# Patient Record
Sex: MALE | Race: WHITE | NOT HISPANIC OR LATINO | URBAN - METROPOLITAN AREA
[De-identification: names, ages, dates, MRNs, and addresses within clinical notes are randomized per-mention and may not be internally consistent; named-entity substitution may affect disease eponyms.]

---

## 2019-04-17 ENCOUNTER — OUTPATIENT (OUTPATIENT)
Dept: OUTPATIENT SERVICES | Facility: HOSPITAL | Age: 64
LOS: 1 days | Discharge: HOME | End: 2019-04-17

## 2019-04-17 ENCOUNTER — OUTPATIENT (OUTPATIENT)
Dept: OUTPATIENT SERVICES | Facility: HOSPITAL | Age: 64
LOS: 1 days | Discharge: HOME | End: 2019-04-17
Payer: COMMERCIAL

## 2019-04-17 DIAGNOSIS — Z79.01 LONG TERM (CURRENT) USE OF ANTICOAGULANTS: ICD-10-CM

## 2019-04-17 DIAGNOSIS — Z01.810 ENCOUNTER FOR PREPROCEDURAL CARDIOVASCULAR EXAMINATION: ICD-10-CM

## 2019-04-17 DIAGNOSIS — R06.02 SHORTNESS OF BREATH: ICD-10-CM

## 2019-04-17 DIAGNOSIS — I25.10 ATHEROSCLEROTIC HEART DISEASE OF NATIVE CORONARY ARTERY WITHOUT ANGINA PECTORIS: ICD-10-CM

## 2019-04-17 DIAGNOSIS — R07.89 OTHER CHEST PAIN: ICD-10-CM

## 2019-04-17 PROCEDURE — 78452 HT MUSCLE IMAGE SPECT MULT: CPT | Mod: 26

## 2019-04-19 ENCOUNTER — INPATIENT (INPATIENT)
Facility: HOSPITAL | Age: 64
LOS: 6 days | Discharge: HOME HEALTH | End: 2019-04-26
Attending: THORACIC SURGERY (CARDIOTHORACIC VASCULAR SURGERY) | Admitting: THORACIC SURGERY (CARDIOTHORACIC VASCULAR SURGERY)
Payer: COMMERCIAL

## 2019-04-19 VITALS
TEMPERATURE: 98 F | SYSTOLIC BLOOD PRESSURE: 150 MMHG | WEIGHT: 182.1 LBS | DIASTOLIC BLOOD PRESSURE: 72 MMHG | RESPIRATION RATE: 16 BRPM | OXYGEN SATURATION: 100 % | HEIGHT: 71 IN | HEART RATE: 81 BPM

## 2019-04-19 DIAGNOSIS — Z96.641 PRESENCE OF RIGHT ARTIFICIAL HIP JOINT: Chronic | ICD-10-CM

## 2019-04-19 PROCEDURE — 71045 X-RAY EXAM CHEST 1 VIEW: CPT | Mod: 26

## 2019-04-19 PROCEDURE — 71250 CT THORAX DX C-: CPT | Mod: 26

## 2019-04-19 RX ORDER — SODIUM CHLORIDE 9 MG/ML
1000 INJECTION INTRAMUSCULAR; INTRAVENOUS; SUBCUTANEOUS
Qty: 0 | Refills: 0 | Status: DISCONTINUED | OUTPATIENT
Start: 2019-04-19 | End: 2019-04-22

## 2019-04-19 RX ORDER — ENOXAPARIN SODIUM 100 MG/ML
40 INJECTION SUBCUTANEOUS EVERY 12 HOURS
Qty: 0 | Refills: 0 | Status: DISCONTINUED | OUTPATIENT
Start: 2019-04-19 | End: 2019-04-19

## 2019-04-19 RX ORDER — SODIUM CHLORIDE 9 MG/ML
3 INJECTION INTRAMUSCULAR; INTRAVENOUS; SUBCUTANEOUS EVERY 8 HOURS
Qty: 0 | Refills: 0 | Status: DISCONTINUED | OUTPATIENT
Start: 2019-04-19 | End: 2019-04-22

## 2019-04-19 RX ORDER — METOPROLOL TARTRATE 50 MG
12.5 TABLET ORAL EVERY 12 HOURS
Qty: 0 | Refills: 0 | Status: DISCONTINUED | OUTPATIENT
Start: 2019-04-19 | End: 2019-04-19

## 2019-04-19 RX ORDER — ATORVASTATIN CALCIUM 80 MG/1
20 TABLET, FILM COATED ORAL AT BEDTIME
Qty: 0 | Refills: 0 | Status: DISCONTINUED | OUTPATIENT
Start: 2019-04-19 | End: 2019-04-22

## 2019-04-19 RX ORDER — DOCUSATE SODIUM 100 MG
100 CAPSULE ORAL THREE TIMES A DAY
Qty: 0 | Refills: 0 | Status: DISCONTINUED | OUTPATIENT
Start: 2019-04-19 | End: 2019-04-22

## 2019-04-19 RX ORDER — ENOXAPARIN SODIUM 100 MG/ML
40 INJECTION SUBCUTANEOUS EVERY 24 HOURS
Qty: 0 | Refills: 0 | Status: DISCONTINUED | OUTPATIENT
Start: 2019-04-19 | End: 2019-04-21

## 2019-04-19 RX ORDER — PANTOPRAZOLE SODIUM 20 MG/1
40 TABLET, DELAYED RELEASE ORAL
Qty: 0 | Refills: 0 | Status: DISCONTINUED | OUTPATIENT
Start: 2019-04-19 | End: 2019-04-22

## 2019-04-19 RX ORDER — ACETAMINOPHEN 500 MG
650 TABLET ORAL ONCE
Qty: 0 | Refills: 0 | Status: COMPLETED | OUTPATIENT
Start: 2019-04-19 | End: 2019-04-21

## 2019-04-19 RX ORDER — METOPROLOL TARTRATE 50 MG
12.5 TABLET ORAL
Qty: 0 | Refills: 0 | Status: DISCONTINUED | OUTPATIENT
Start: 2019-04-19 | End: 2019-04-22

## 2019-04-19 RX ORDER — LANOLIN ALCOHOL/MO/W.PET/CERES
5 CREAM (GRAM) TOPICAL AT BEDTIME
Qty: 0 | Refills: 0 | Status: DISCONTINUED | OUTPATIENT
Start: 2019-04-19 | End: 2019-04-22

## 2019-04-19 RX ORDER — ASPIRIN/CALCIUM CARB/MAGNESIUM 324 MG
81 TABLET ORAL DAILY
Qty: 0 | Refills: 0 | Status: DISCONTINUED | OUTPATIENT
Start: 2019-04-19 | End: 2019-04-22

## 2019-04-19 RX ADMIN — Medication 5 MILLIGRAM(S): at 22:21

## 2019-04-19 RX ADMIN — SODIUM CHLORIDE 3 MILLILITER(S): 9 INJECTION INTRAMUSCULAR; INTRAVENOUS; SUBCUTANEOUS at 14:41

## 2019-04-19 RX ADMIN — Medication 12.5 MILLIGRAM(S): at 19:09

## 2019-04-19 RX ADMIN — ATORVASTATIN CALCIUM 20 MILLIGRAM(S): 80 TABLET, FILM COATED ORAL at 22:18

## 2019-04-19 RX ADMIN — SODIUM CHLORIDE 3 MILLILITER(S): 9 INJECTION INTRAMUSCULAR; INTRAVENOUS; SUBCUTANEOUS at 22:21

## 2019-04-19 RX ADMIN — SODIUM CHLORIDE 75 MILLILITER(S): 9 INJECTION INTRAMUSCULAR; INTRAVENOUS; SUBCUTANEOUS at 14:40

## 2019-04-19 RX ADMIN — Medication 100 MILLIGRAM(S): at 22:18

## 2019-04-19 NOTE — PRE-ANESTHESIA EVALUATION ADULT - NSANTHPEFT_GEN_ALL_CORE
CV: normal S1/S2  Chest: CTA  Extremities: palpable left radial pulse, unable to assess right radial pulse due to dressing

## 2019-04-19 NOTE — PROGRESS NOTE ADULT - SUBJECTIVE AND OBJECTIVE BOX
Surgeon: /Chyna/ Autumn    Consult requesting by: Ralph    HISTORY OF PRESENT ILLNESS:  64 yo M h/o HTN has been c/o chest pain had + [place] elective cardiac cath, which revealed severe CAD with Left main involvement. Patient admitted post-procedure for myocardial revascularization via CABG      NYHA functional class    [ ] Class I (no limitation) [ X ] Class II (slight limitation) [ ] Class III (marked limitation) [ ] Class IV (symptoms at rest)    PAST MEDICAL & SURGICAL HISTORY:  Malignant neoplasm of urinary bladder, unspecified site  History of right hip replacement  HTN    Home Medications: ASA 81 mg and irbesartan    MEDICATIONS  (STANDING):  aspirin enteric coated 81 milliGRAM(s) Oral daily  atorvastatin 20 milliGRAM(s) Oral at bedtime  docusate sodium 100 milliGRAM(s) Oral three times a day  enoxaparin Injectable 40 milliGRAM(s) SubCutaneous every 12 hours  melatonin 5 milliGRAM(s) Oral at bedtime  metoprolol tartrate 12.5 milliGRAM(s) Oral two times a day  pantoprazole    Tablet 40 milliGRAM(s) Oral before breakfast  sodium chloride 0.9% lock flush 3 milliLiter(s) IV Push every 8 hours  sodium chloride 0.9%. 1000 milliLiter(s) (75 mL/Hr) IV Continuous <Continuous>    MEDICATIONS  (PRN):    Antiplatelet therapy:     ASA today                      Last dose/amt:    Allergies    No Known Allergies    Intolerances        SOCIAL HISTORY:  denies alcohol abuse, smoking and illicit drug usage  Occupation:  Lives with: wife  Assisted device use: none  5 meter walk test: 4.8 / 5.1 / 5.2  FAMILY HISTORY: no early heart disease      Review of Systems  CONSTITUTIONAL: no fever, chills or night sweats]   NEURO:  denies seizures, paralysis or paresthesias                                                                                EYES: wears glasses, no double vision, no blurry vision                                                                          ENMT: no difficulty hearing, vertigo, dysphagia, epistaxis recent dental work [ ]                                      CV:  denies chest pain, BRIGGS or palpatations at current time                                                                                            RESPIRATORY:  no cough or hemoptysis                                                                 GI:  no nausea, vomiting, constipation or diarrhea   : denies dysuria, hematuria, incontinence or retention                                                                                           MUSKULOSKELETAL:  denies joint swelling or muscle weakness  PSYCH:  denies dementia, depresion, anxiety   ENDOCRINE:  cold intolerance[ ] heat intolerance[ ] polydipsia[ ]                                                                                                                                                                                                PHYSICAL EXAM  CONSTITUTIONAL:    well nourished, well developed, mildly overweight in NAD                                                                       Neuro: oriented to person/place & time with no focal motor or sensory  deficits     Eyes: PERRLA, EOMI, no nystagmus, sclera anicteric  ENT:  nasal/oral mucosa with absence of cyanosis, fair dentition  Neck: no jugular vein distention, trachea midline, no goiter   Chest: bilateral breath sounds with good air movement absence of wheezes, rales, or rhonchi                                                                           CV:  RSR, S1S2, no significant murmur appreciated  Carotids: No Bruits  GI:  soft, non-tender non-distended, + bowel sounds                                                                                                          Extremities:  no evidence of cyanosis or deformity, no pedal edema   Extremity Pulses: right / left:  femorals 2+/ 2+; DP's 2+/2+; radials 2+/2+    ? negative Allens  SKIN : no rashes                                                          LABS:  Pending    Cardiac Cath: double vessel disease    Recommendation: (Procedures/Evaluations)  CT Chest without contrast  Carotid Duplex   PFT : Simple PFT   TTE    STS Score:   Isolated CAB  Risk of Mortality:  0.763%  Renal Failure:  0.885%  Permanent Stroke:  0.614%  Prolonged Ventilation:  2.539%  DSW Infection:  0.105%  Reoperation:  1.671%  Morbidity or Mortality:  4.572%  Short Length of Stay:  68.737%  Long Length of Stay:  1.584%  Impression:    CAD [ ]      Assessment/ Plan:  Will initiate pre-op work-up  Attending to see Surgeon: /Chyna/ Autumn    Consult requesting by: Shaye    HISTORY OF PRESENT ILLNESS:  64 yo M h/o HTN has been c/o chest pain had + [place] for elective cardiac cath, which revealed severe CAD with Left main involvement. Patient admitted post-procedure for myocardial revascularization via CABG      NYHA functional class    [ ] Class I (no limitation) [ X ] Class II (slight limitation) [ ] Class III (marked limitation) [ ] Class IV (symptoms at rest)    PAST MEDICAL & SURGICAL HISTORY:  Malignant neoplasm of urinary bladder, unspecified site  History of right hip replacement  HTN    Home Medications: ASA 81 mg and irbesartan    MEDICATIONS  (STANDING):  aspirin enteric coated 81 milliGRAM(s) Oral daily  atorvastatin 20 milliGRAM(s) Oral at bedtime  docusate sodium 100 milliGRAM(s) Oral three times a day  enoxaparin Injectable 40 milliGRAM(s) SubCutaneous every 12 hours  melatonin 5 milliGRAM(s) Oral at bedtime  metoprolol tartrate 12.5 milliGRAM(s) Oral two times a day  pantoprazole    Tablet 40 milliGRAM(s) Oral before breakfast  sodium chloride 0.9% lock flush 3 milliLiter(s) IV Push every 8 hours  sodium chloride 0.9%. 1000 milliLiter(s) (75 mL/Hr) IV Continuous <Continuous>    MEDICATIONS  (PRN):    Antiplatelet therapy:     ASA today                      Last dose/amt:    Allergies    No Known Allergies    SOCIAL HISTORY:  denies alcohol abuse, smoking and illicit drug usage  Occupation:  Lives with: wife  Assisted device use: none  5 meter walk test: 4.8 / 5.1 / 5.2  FAMILY HISTORY: no early heart disease    Review of Systems  CONSTITUTIONAL: no fever, chills or night sweats]   NEURO:  denies seizures, paralysis or paresthesias                                                                                EYES: wears glasses, no double vision, no blurry vision                                                                          ENMT: no difficulty hearing, vertigo, dysphagia, epistaxis recent dental work [ ]                                      CV:  denies chest pain, BRIGGS or palpatations at current time                                                                                            RESPIRATORY:  no cough or hemoptysis                                                                 GI:  no nausea, vomiting, constipation or diarrhea   : denies dysuria, hematuria, incontinence or retention                                                                                           MUSKULOSKELETAL:  denies joint swelling or muscle weakness  PSYCH:  denies dementia, depresion, anxiety   ENDOCRINE:  cold intolerance[ ] heat intolerance[ ] polydipsia[ ]                                                                                                                                                                                                PHYSICAL EXAM  CONSTITUTIONAL:    well nourished, well developed, mildly overweight in NAD                                                                       Neuro: oriented to person/place & time with no focal motor or sensory  deficits     Eyes: PERRLA, EOMI, no nystagmus, sclera anicteric  ENT:  nasal/oral mucosa with absence of cyanosis, fair dentition  Neck: no jugular vein distention, trachea midline, no goiter   Chest: bilateral breath sounds with good air movement absence of wheezes, rales, or rhonchi                                                                           CV:  RSR, S1S2, no significant murmur appreciated  Carotids: No Bruits  GI:  soft, non-tender non-distended, + bowel sounds                                                                                                          Extremities:  no evidence of cyanosis or deformity, no pedal edema   Extremity Pulses: right / left:  femorals 2+/ 2+; DP's 2+/2+; radials 2+/2+    ? negative Allens  SKIN : no rashes                                                          LABS:  Pending    Cardiac Cath: double vessel disease    Recommendation: (Procedures/Evaluations)  CT Chest without contrast  Carotid Duplex   PFT : Simple PFT   TTE    STS Score:   Isolated CAB  Risk of Mortality:  0.763%  Renal Failure:  0.885%  Permanent Stroke:  0.614%  Prolonged Ventilation:  2.539%  DSW Infection:  0.105%  Reoperation:  1.671%  Morbidity or Mortality:  4.572%  Short Length of Stay:  68.737%  Long Length of Stay:  1.584%  Impression:    CAD [ ]      Assessment/ Plan:  Will initiate pre-op work-up  Attending to see

## 2019-04-19 NOTE — CHART NOTE - NSCHARTNOTEFT_GEN_A_CORE
PRE-OP DIAGNOSIS: suspected CAD/abnormla stress test    PROCEDURE: [facility] with coronary angiography    Physician: RYAN German MD  Assistant: TRINI Dhillon MD    ANESTHESIA TYPE:  [  ]General Anesthesia  [x  ] Sedation  [ x ] Local/Regional    ESTIMATED BLOOD LOSS:    10   mL    CONDITION  [  ] Critical  [  ] Serious  [  ]Fair  [ x ]Good    IV CONTRAST:     55        mL    FINDINGS    Left Heart Catheterization:  LVEF%: 65  LVEDP: Normal  Right dominant  Right radial 6 Fr  Radial D stat    2 V CAD      LEFT HEART CATHETERIZATION                                    Left main distal LM 70%, IVUS 4.6 mm2    LAD:   mid LAD 80%                     Diag: Small diffuse ds    Left Circumflex: Small, moderate ds    Right Coronary Artery: Med to large vessel, mild ectasia,   RPDA normal  RPL Normal    Ramus Intermed:      POST-OP DIAGNOSIS  2 V CAD      PLAN OF CARE    [x ] Return to In-patient bed    [ x] CT Surgery consult called

## 2019-04-19 NOTE — H&P CARDIOLOGY - HISTORY OF PRESENT ILLNESS
62 yo M h/o HTN has been c/o chest pain had + stress test [place]pt [place] for elective cardiac cath

## 2019-04-19 NOTE — PRE-ANESTHESIA EVALUATION ADULT - NSANTHPMHFT_GEN_ALL_CORE
HTN, HLD, CAD (hx of CP with positive stress test, elective cath on 4/19 found disease of the LM, LAD, and RCA distributions), ET ~ 1 mile, bladder ca s/p partial resection, GERD

## 2019-04-20 LAB
ALBUMIN SERPL ELPH-MCNC: 3.8 G/DL — SIGNIFICANT CHANGE UP (ref 3.5–5.2)
ALP SERPL-CCNC: 63 U/L — SIGNIFICANT CHANGE UP (ref 30–115)
ALT FLD-CCNC: 12 U/L — SIGNIFICANT CHANGE UP (ref 0–41)
ANION GAP SERPL CALC-SCNC: 12 MMOL/L — SIGNIFICANT CHANGE UP (ref 7–14)
APPEARANCE UR: CLEAR — SIGNIFICANT CHANGE UP
APTT BLD: 32.4 SEC — SIGNIFICANT CHANGE UP (ref 27–39.2)
AST SERPL-CCNC: 13 U/L — SIGNIFICANT CHANGE UP (ref 0–41)
BACTERIA # UR AUTO: ABNORMAL /HPF
BASOPHILS # BLD AUTO: 0.03 K/UL — SIGNIFICANT CHANGE UP (ref 0–0.2)
BASOPHILS NFR BLD AUTO: 0.5 % — SIGNIFICANT CHANGE UP (ref 0–1)
BILIRUB SERPL-MCNC: 0.5 MG/DL — SIGNIFICANT CHANGE UP (ref 0.2–1.2)
BILIRUB UR-MCNC: NEGATIVE — SIGNIFICANT CHANGE UP
BLD GP AB SCN SERPL QL: SIGNIFICANT CHANGE UP
BUN SERPL-MCNC: 24 MG/DL — HIGH (ref 10–20)
CALCIUM SERPL-MCNC: 9 MG/DL — SIGNIFICANT CHANGE UP (ref 8.5–10.1)
CHLORIDE SERPL-SCNC: 107 MMOL/L — SIGNIFICANT CHANGE UP (ref 98–110)
CO2 SERPL-SCNC: 24 MMOL/L — SIGNIFICANT CHANGE UP (ref 17–32)
COLOR SPEC: YELLOW — SIGNIFICANT CHANGE UP
CREAT SERPL-MCNC: 1.3 MG/DL — SIGNIFICANT CHANGE UP (ref 0.7–1.5)
DIFF PNL FLD: ABNORMAL
EOSINOPHIL # BLD AUTO: 0.18 K/UL — SIGNIFICANT CHANGE UP (ref 0–0.7)
EOSINOPHIL NFR BLD AUTO: 3.3 % — SIGNIFICANT CHANGE UP (ref 0–8)
ESTIMATED AVERAGE GLUCOSE: 120 MG/DL — HIGH (ref 68–114)
GLUCOSE SERPL-MCNC: 91 MG/DL — SIGNIFICANT CHANGE UP (ref 70–99)
GLUCOSE UR QL: NEGATIVE MG/DL — SIGNIFICANT CHANGE UP
HBA1C BLD-MCNC: 5.8 % — HIGH (ref 4–5.6)
HCT VFR BLD CALC: 39.7 % — LOW (ref 42–52)
HGB BLD-MCNC: 12.9 G/DL — LOW (ref 14–18)
IMM GRANULOCYTES NFR BLD AUTO: 0.4 % — HIGH (ref 0.1–0.3)
INR BLD: 1.02 RATIO — SIGNIFICANT CHANGE UP (ref 0.65–1.3)
KETONES UR-MCNC: NEGATIVE — SIGNIFICANT CHANGE UP
LEUKOCYTE ESTERASE UR-ACNC: NEGATIVE — SIGNIFICANT CHANGE UP
LYMPHOCYTES # BLD AUTO: 1.24 K/UL — SIGNIFICANT CHANGE UP (ref 1.2–3.4)
LYMPHOCYTES # BLD AUTO: 22.5 % — SIGNIFICANT CHANGE UP (ref 20.5–51.1)
MCHC RBC-ENTMCNC: 28.1 PG — SIGNIFICANT CHANGE UP (ref 27–31)
MCHC RBC-ENTMCNC: 32.5 G/DL — SIGNIFICANT CHANGE UP (ref 32–37)
MCV RBC AUTO: 86.5 FL — SIGNIFICANT CHANGE UP (ref 80–94)
MONOCYTES # BLD AUTO: 0.47 K/UL — SIGNIFICANT CHANGE UP (ref 0.1–0.6)
MONOCYTES NFR BLD AUTO: 8.5 % — SIGNIFICANT CHANGE UP (ref 1.7–9.3)
MRSA PCR RESULT.: NEGATIVE — SIGNIFICANT CHANGE UP
NEUTROPHILS # BLD AUTO: 3.57 K/UL — SIGNIFICANT CHANGE UP (ref 1.4–6.5)
NEUTROPHILS NFR BLD AUTO: 64.8 % — SIGNIFICANT CHANGE UP (ref 42.2–75.2)
NITRITE UR-MCNC: NEGATIVE — SIGNIFICANT CHANGE UP
NRBC # BLD: 0 /100 WBCS — SIGNIFICANT CHANGE UP (ref 0–0)
NT-PROBNP SERPL-SCNC: 63 PG/ML — SIGNIFICANT CHANGE UP (ref 0–300)
PH UR: 6 — SIGNIFICANT CHANGE UP (ref 5–8)
PLATELET # BLD AUTO: 261 K/UL — SIGNIFICANT CHANGE UP (ref 130–400)
POTASSIUM SERPL-MCNC: 4.4 MMOL/L — SIGNIFICANT CHANGE UP (ref 3.5–5)
POTASSIUM SERPL-SCNC: 4.4 MMOL/L — SIGNIFICANT CHANGE UP (ref 3.5–5)
PROT SERPL-MCNC: 6.1 G/DL — SIGNIFICANT CHANGE UP (ref 6–8)
PROT UR-MCNC: NEGATIVE MG/DL — SIGNIFICANT CHANGE UP
PROTHROM AB SERPL-ACNC: 11.7 SEC — SIGNIFICANT CHANGE UP (ref 9.95–12.87)
RBC # BLD: 4.59 M/UL — LOW (ref 4.7–6.1)
RBC # FLD: 12.6 % — SIGNIFICANT CHANGE UP (ref 11.5–14.5)
RBC CASTS # UR COMP ASSIST: ABNORMAL /HPF
SODIUM SERPL-SCNC: 143 MMOL/L — SIGNIFICANT CHANGE UP (ref 135–146)
SP GR SPEC: 1.02 — SIGNIFICANT CHANGE UP (ref 1.01–1.03)
T4 FREE SERPL-MCNC: 1.1 NG/DL — SIGNIFICANT CHANGE UP (ref 0.9–1.8)
TSH SERPL-MCNC: 1.1 UIU/ML — SIGNIFICANT CHANGE UP (ref 0.27–4.2)
TYPE + AB SCN PNL BLD: SIGNIFICANT CHANGE UP
UROBILINOGEN FLD QL: 0.2 MG/DL — SIGNIFICANT CHANGE UP (ref 0.2–0.2)
WBC # BLD: 5.51 K/UL — SIGNIFICANT CHANGE UP (ref 4.8–10.8)
WBC # FLD AUTO: 5.51 K/UL — SIGNIFICANT CHANGE UP (ref 4.8–10.8)
WBC UR QL: SIGNIFICANT CHANGE UP /HPF

## 2019-04-20 PROCEDURE — 99232 SBSQ HOSP IP/OBS MODERATE 35: CPT

## 2019-04-20 PROCEDURE — 93880 EXTRACRANIAL BILAT STUDY: CPT | Mod: 26

## 2019-04-20 RX ADMIN — Medication 100 MILLIGRAM(S): at 21:22

## 2019-04-20 RX ADMIN — PANTOPRAZOLE SODIUM 40 MILLIGRAM(S): 20 TABLET, DELAYED RELEASE ORAL at 06:55

## 2019-04-20 RX ADMIN — SODIUM CHLORIDE 3 MILLILITER(S): 9 INJECTION INTRAMUSCULAR; INTRAVENOUS; SUBCUTANEOUS at 21:23

## 2019-04-20 RX ADMIN — Medication 81 MILLIGRAM(S): at 11:21

## 2019-04-20 RX ADMIN — Medication 100 MILLIGRAM(S): at 06:55

## 2019-04-20 RX ADMIN — Medication 5 MILLIGRAM(S): at 21:23

## 2019-04-20 RX ADMIN — Medication 12.5 MILLIGRAM(S): at 06:55

## 2019-04-20 RX ADMIN — ATORVASTATIN CALCIUM 20 MILLIGRAM(S): 80 TABLET, FILM COATED ORAL at 21:22

## 2019-04-20 RX ADMIN — SODIUM CHLORIDE 3 MILLILITER(S): 9 INJECTION INTRAMUSCULAR; INTRAVENOUS; SUBCUTANEOUS at 14:06

## 2019-04-20 RX ADMIN — SODIUM CHLORIDE 3 MILLILITER(S): 9 INJECTION INTRAMUSCULAR; INTRAVENOUS; SUBCUTANEOUS at 06:55

## 2019-04-20 RX ADMIN — Medication 12.5 MILLIGRAM(S): at 17:25

## 2019-04-20 RX ADMIN — Medication 100 MILLIGRAM(S): at 14:06

## 2019-04-20 NOTE — PROGRESS NOTE ADULT - SUBJECTIVE AND OBJECTIVE BOX
SUBJ:  no cp or sob    MEDICATIONS  (STANDING):  aspirin enteric coated 81 milliGRAM(s) Oral daily  atorvastatin 20 milliGRAM(s) Oral at bedtime  docusate sodium 100 milliGRAM(s) Oral three times a day  enoxaparin Injectable 40 milliGRAM(s) SubCutaneous every 24 hours  melatonin 5 milliGRAM(s) Oral at bedtime  metoprolol tartrate 12.5 milliGRAM(s) Oral two times a day  pantoprazole    Tablet 40 milliGRAM(s) Oral before breakfast  sodium chloride 0.9% lock flush 3 milliLiter(s) IV Push every 8 hours  sodium chloride 0.9%. 1000 milliLiter(s) (75 mL/Hr) IV Continuous <Continuous>    MEDICATIONS  (PRN):  acetaminophen   Tablet .. 650 milliGRAM(s) Oral once PRN Moderate Pain (4 - 6)            Vital Signs Last 24 Hrs  T(C): 36.2 (20 Apr 2019 05:31), Max: 36.6 (19 Apr 2019 14:01)  T(F): 97.1 (20 Apr 2019 05:31), Max: 97.9 (19 Apr 2019 14:01)  HR: 76 (20 Apr 2019 05:31) (72 - 81)  BP: 126/66 (20 Apr 2019 05:31) (126/66 - 150/72)  BP(mean): --  RR: 17 (20 Apr 2019 05:31) (16 - 17)  SpO2: 100% (19 Apr 2019 15:21) (100% - 100%)          PHYSICAL EXAM:  · CONSTITUTIONAL:	Well-developed, well nourished    BMI-  ·RESPIRATORY:   airway patent; breath sounds equal; good air movement; respirations non-labored; clear to auscultation bilaterally; no chest wall tenderness; no intercostal retractions; no rales,rhonchi or wheeze  · CARDIOVASCULAR	regular rate and rhythm  no rub  no murmur  normal PMI  · EXTREMITIES: No cyanosis, clubbing or edema  · VASCULAR: 	Equal and normal pulses (carotid, femoral, dorsalis pedis)  	  TELEMETRY:sinus    ECG:    TTE:    LABS:                        12.9   5.51  )-----------( 261      ( 20 Apr 2019 07:31 )             39.7     04-20    143  |  107  |  24<H>  ----------------------------<  91  4.4   |  24  |  1.3    Ca    9.0      20 Apr 2019 07:31    TPro  6.1  /  Alb  3.8  /  TBili  0.5  /  DBili  x   /  AST  13  /  ALT  12  /  AlkPhos  63  04-20        PT/INR - ( 20 Apr 2019 07:31 )   PT: 11.70 sec;   INR: 1.02 ratio         PTT - ( 20 Apr 2019 07:31 )  PTT:32.4 sec    I&O's Summary    19 Apr 2019 07:01  -  20 Apr 2019 07:00  --------------------------------------------------------  IN: 375 mL / OUT: 0 mL / NET: 375 mL      BNPSerum Pro-Brain Natriuretic Peptide: 63 pg/mL (04-20 @ 07:31)    RADIOLOGY & ADDITIONAL STUDIES:    IMPRESSION AND PLAN:  s/p lHC   LM and LAD   NL EF on echo  stress test 2 days ago > 4mets     Rec  await CABG  please allow off tele for shower today

## 2019-04-21 RX ORDER — CHLORHEXIDINE GLUCONATE 213 G/1000ML
1 SOLUTION TOPICAL ONCE
Qty: 0 | Refills: 0 | Status: COMPLETED | OUTPATIENT
Start: 2019-04-22 | End: 2019-04-22

## 2019-04-21 RX ORDER — CHLORHEXIDINE GLUCONATE 213 G/1000ML
1 SOLUTION TOPICAL ONCE
Qty: 0 | Refills: 0 | Status: COMPLETED | OUTPATIENT
Start: 2019-04-21 | End: 2019-04-21

## 2019-04-21 RX ORDER — CHLORHEXIDINE GLUCONATE 213 G/1000ML
10 SOLUTION TOPICAL ONCE
Qty: 0 | Refills: 0 | Status: COMPLETED | OUTPATIENT
Start: 2019-04-21 | End: 2019-04-22

## 2019-04-21 RX ORDER — ALBUMIN HUMAN 25 %
3000 VIAL (ML) INTRAVENOUS ONCE
Qty: 0 | Refills: 0 | Status: DISCONTINUED | OUTPATIENT
Start: 2019-04-22 | End: 2019-04-22

## 2019-04-21 RX ADMIN — SODIUM CHLORIDE 3 MILLILITER(S): 9 INJECTION INTRAMUSCULAR; INTRAVENOUS; SUBCUTANEOUS at 04:14

## 2019-04-21 RX ADMIN — CHLORHEXIDINE GLUCONATE 1 APPLICATION(S): 213 SOLUTION TOPICAL at 21:43

## 2019-04-21 RX ADMIN — SODIUM CHLORIDE 3 MILLILITER(S): 9 INJECTION INTRAMUSCULAR; INTRAVENOUS; SUBCUTANEOUS at 21:43

## 2019-04-21 RX ADMIN — Medication 5 MILLIGRAM(S): at 21:43

## 2019-04-21 RX ADMIN — Medication 12.5 MILLIGRAM(S): at 17:07

## 2019-04-21 RX ADMIN — Medication 650 MILLIGRAM(S): at 11:00

## 2019-04-21 RX ADMIN — SODIUM CHLORIDE 3 MILLILITER(S): 9 INJECTION INTRAMUSCULAR; INTRAVENOUS; SUBCUTANEOUS at 13:30

## 2019-04-21 RX ADMIN — CHLORHEXIDINE GLUCONATE 1 APPLICATION(S): 213 SOLUTION TOPICAL at 17:50

## 2019-04-21 RX ADMIN — ATORVASTATIN CALCIUM 20 MILLIGRAM(S): 80 TABLET, FILM COATED ORAL at 21:42

## 2019-04-21 RX ADMIN — PANTOPRAZOLE SODIUM 40 MILLIGRAM(S): 20 TABLET, DELAYED RELEASE ORAL at 07:30

## 2019-04-21 RX ADMIN — Medication 81 MILLIGRAM(S): at 11:28

## 2019-04-21 RX ADMIN — Medication 650 MILLIGRAM(S): at 10:39

## 2019-04-21 RX ADMIN — Medication 100 MILLIGRAM(S): at 05:08

## 2019-04-21 RX ADMIN — ENOXAPARIN SODIUM 40 MILLIGRAM(S): 100 INJECTION SUBCUTANEOUS at 11:28

## 2019-04-21 RX ADMIN — Medication 100 MILLIGRAM(S): at 21:42

## 2019-04-21 RX ADMIN — Medication 12.5 MILLIGRAM(S): at 05:08

## 2019-04-21 NOTE — PROGRESS NOTE ADULT - SUBJECTIVE AND OBJECTIVE BOX
Cardiac Surgery Pre-op Note:  CC: Patient is a 63y old  Male who presents with a chief complaint of coronary artery disease involving left main (2019 13:52)    Referring Physician:   Dr. Cuba                                                                               Surgeon: Dr. Clemente     Procedure: (Date) (Procedure)  2019 CABG    Allergies    No Known Allergies      HPI:  64 yo M h/o HTN has been c/o chest pain had + stress test [place]pt [place] for elective cardiac cath (2019 09:38)  Cardiac cath reveals significant left main and LAD disease.      PAST MEDICAL & SURGICAL HISTORY:  Malignant neoplasm of urinary bladder, unspecified site  History of right hip replacement      MEDICATIONS  (STANDING):  aspirin enteric coated 81 milliGRAM(s) Oral daily  atorvastatin 20 milliGRAM(s) Oral at bedtime  docusate sodium 100 milliGRAM(s) Oral three times a day  enoxaparin Injectable 40 milliGRAM(s) SubCutaneous every 24 hours  melatonin 5 milliGRAM(s) Oral at bedtime  metoprolol tartrate 12.5 milliGRAM(s) Oral two times a day  pantoprazole    Tablet 40 milliGRAM(s) Oral before breakfast  sodium chloride 0.9% lock flush 3 milliLiter(s) IV Push every 8 hours  sodium chloride 0.9%. 1000 milliLiter(s) (75 mL/Hr) IV Continuous <Continuous>    Labs:                        12.9   5.51  )-----------( 261      ( 2019 07:31 )             39.7         143  |  107  |  24<H>  ----------------------------<  91  4.4   |  24  |  1.3    Ca    9.0      2019 07:31    TPro  6.1  /  Alb  3.8  /  TBili  0.5  /  DBili  x   /  AST  13  /  ALT  12  /  AlkPhos  63      PT/INR - ( 2019 07:31 )   PT: 11.70 sec;   INR: 1.02 ratio         PTT - ( 2019 07:31 )  PTT:32.4 sec    HGB A1C: Hemoglobin A1C, Whole Blood: 5.8 % ( @ 07:31)    Pro-BNP: Serum Pro-Brain Natriuretic Peptide: 63 pg/mL ( @ 07:31)    Thyroid Panel:  @ 07:31/1.10  1.1/--/--    MRSA: MRSA PCR Result.: Negative ( @ 12:51)   / MSSA:   Urinalysis Basic - ( 2019 20:05 )    Color: Yellow / Appearance: Clear / S.020 / pH: x  Gluc: x / Ketone: Negative  / Bili: Negative / Urobili: 0.2 mg/dL   Blood: x / Protein: Negative mg/dL / Nitrite: Negative   Leuk Esterase: Negative / RBC: 11-25 /HPF / WBC 1-2 /HPF   Sq Epi: x / Non Sq Epi: x / Bacteria: Few /HPF    CXR: < from: Xray Chest 1 View AP/PA (19 @ 14:07) >  Impression:      No radiographic evidence of acute cardiopulmonary disease.    < end of copied text >      Carotid Duplex:   < from: VA Duplex Carotid, Bilat (19 @ 14:47) >  Impression:    20-39% stenosis of the right internal carotid artery.    20-39% stenosis of the left internal carotid artery.    < end of copied text >      PFT's: FEV1: 96%    Echocardiogram: Transthoracic Echocardiogram:    EXAM:  2-D ECHO (TTE) COMPLETE        < from: VA Duplex Carotid, Bilat (19 @ 14:47) >  Impression:    20-39% stenosis of the right internal carotid artery.    20-39% stenosis of the left internal carotid artery.    < end of copied text >  PROCEDURE DATE:  2019      INTERPRETATION:  REPORT:    TRANSTHORACIC ECHOCARDIOGRAM REPORT         Patient Name:   SHAUNA BURKS Accession #: [id number]  Medical Rec #:  [id number]       Height:      70.9 in 180.0 cm  YOB: 1955      Weight:      182.1 lb 82.60 kg  Patient Age:    63 years       BSA:         2.02 m²  Patient Gender: M              BP:          150/72 mmHg       Date of Exam:        2019 2:19:51 PM  Referring Physician: [id number] SUDHIR CUBA  Sonographer:         Mary Carmen Hoffman  Fellow:              Jad Evangelista     Reading Physician:   Sudhir Cuba M.D.    Procedure:   2D Echo/Doppler/Color Doppler Complete.  Indications: R07.9 - Chest Pain, unspecified  Diagnosis:   R07.9 - Chest Pain, unspecified         Summary:   1. Thickening of the anterior mitral valve leaflet.    PHYSICIAN INTERPRETATION:  Left Ventricle: Normal left ventricular size and wall thicknesses, with   normal systolic and diastolic function.  Right Ventricle: Normal right ventricular size and function.  Left Atrium: Normal left atrial size.  Right Atrium: Normal right atrial size.  Pericardium: There is no evidence of pericardial effusion.  Mitral Valve: Thickening of the anterior mitral valve leaflet. Trace   mitral valve regurgitation is seen.  Tricuspid Valve: The tricuspid valve is normal in structure. Trivial   tricuspid regurgitation is visualized.  Aortic Valve: The aortic valve is trileaflet. No evidence of aortic   stenosis. No evidence of aortic valve regurgitation is seen.  Pulmonic Valve: Trace pulmonic valve regurgitation.  Aorta: The aortic root is normal in size and structure.       2D AND M-MODE MEASUREMENTS (normal ranges within parentheses):  Left    Normal   Aorta/Left             Normal  Ventricle:                     Atrium:  IVSd (2D):  0.81 cm  (0.7-1.1) AoV Cusp       1.74  (1.5-2.6)  LVPWd       0.80 cm  (0.7-1.1) Separation:     cm  (2D):                          Left Atrium    3.52  (1.9-4.0)  LVIDd       5.19 cm  (3.4-5.7) (Mmode):        cm  (2D):                          Right  LVIDs       3.07 cm            Ventricle:  (2D):                          RVd (Mmode):   2.73 cm  LV FS       40.8 %    (>25%)   RVd (2D):      2.65 cm  (2D):  IVSd        0.89 cm  (0.7-1.1)  (Mmode):  LVPWd       0.76 cm  (0.7-1.1)  (Mmode):  LVIDd       5.46 cm  (3.4-5.7)  (Mmode):  LVIDs       3.59 cm  (Mmode):  LV FS       34.3 %    (>25%)  (Mmode):  Relative     0.31     (<0.42)  Wall  Thickness  Rel. Wall    0.28     (<0.42)  Thickness  Mm  LV Mass    80.9 g/m²  Index:  Mmode    SPECTRAL DOPPLER ANALYSIS:  LV DIASTOLIC FUNCTION:  MV Peak E: 0.79 m/s Decel Time: 241 msec  MV Peak A: 1.02 m/s  E/A Ratio: 0.77    Aortic Valve:  AoV VMax:    1.33 m/s AoV Area, Vmax: 2.46 cm² Vmax Indx: 1.22 cm²/m²  AoV Pk Grad: 7.0 mmHg    LVOT Vmax: 0.96 m/s  LVOT VTI:  0.21 m  LVOT Diam: 2.08 cm    Mitral Valve:  MV P1/2 Time: 69.78 msec  MV Area, PHT: 3.15 cm²    Tricuspid Valve and PA/RV Systolic Pressure: TR Max Velocity: 1.89 m/s RA   Pressure:  RVSP/PASP:       [id number] Sudhir Cuba M.D., Electronically signed on 2019 at 4:16:50   PM              *** Final ***                    SUDHIR CUBA MD  This document has been electronically signed. 2019  2:19PM             (19 @ 14:19)      Cardiac catheterization:    Gen: WN/WD NAD  Neuro: A&Ox3, nonfocal  Pulm: CTA B/L  CV: RRR, S1S2 +systolic murmur  Abd: Soft, NT, ND +BS  Ext: No edema, + peripheral pulses    Cardiac Surgery Risk Factors:  CVA and/or carotid/cerebrovascular disease:  No   Aortoiliac disease: No   Previous MI: No   Previous Cardiac Surgery: No   Hemodynamics-Unstable or Shock: No   Diabetes: No   Hepatic Failure: No   Renal failure and/or dialysis: No   CKD: No Stage   Heart failure:(Type/Current or recent): No    COPD: No   Immune System Deficiency: No   Malignant Ventricular Arrhythmia: No     STS Score:   Isolated CAB  Risk of Mortality: 0.763%  Renal Failure: 0.885%  Permanent Stroke: 0.614%  Prolonged Ventilation: 2.539%  DSW Infection: 0.105%  Reoperation: 1.671%  Morbidity or Mortality: 4.572%  Short Length of Stay: 68.737%  Long Length of Stay: 1.584%    Pt has AICD/PPM [ ] Yes  [x ] No             Brand Name:  Pre-op Beta Blocker ordered within 24 hrs of surgery (CABG ONLY)?  [x ] Yes  [ ] No  If not, Why?  Type & Cross Ordered [ x] Yes  [ ] No  NPO after Midnight [x ] Yes  [ ] No  Hibiclens/Peridex ordered [x ] Yes  [ ] No  Intraop on Hold: PRBCs, CXR, BEATRIZ [x ]   Consent obtained  [x ] Yes  [ ] No

## 2019-04-22 ENCOUNTER — TRANSCRIPTION ENCOUNTER (OUTPATIENT)
Age: 64
End: 2019-04-22

## 2019-04-22 LAB
ALBUMIN SERPL ELPH-MCNC: 4 G/DL — SIGNIFICANT CHANGE UP (ref 3.5–5.2)
ALBUMIN SERPL ELPH-MCNC: 4 G/DL — SIGNIFICANT CHANGE UP (ref 3.5–5.2)
ALP SERPL-CCNC: 36 U/L — SIGNIFICANT CHANGE UP (ref 30–115)
ALP SERPL-CCNC: 60 U/L — SIGNIFICANT CHANGE UP (ref 30–115)
ALT FLD-CCNC: 10 U/L — SIGNIFICANT CHANGE UP (ref 0–41)
ALT FLD-CCNC: 7 U/L — SIGNIFICANT CHANGE UP (ref 0–41)
ANION GAP SERPL CALC-SCNC: 11 MMOL/L — SIGNIFICANT CHANGE UP (ref 7–14)
ANION GAP SERPL CALC-SCNC: 13 MMOL/L — SIGNIFICANT CHANGE UP (ref 7–14)
APTT BLD: 32.9 SEC — SIGNIFICANT CHANGE UP (ref 27–39.2)
APTT BLD: 34.7 SEC — SIGNIFICANT CHANGE UP (ref 27–39.2)
AST SERPL-CCNC: 12 U/L — SIGNIFICANT CHANGE UP (ref 0–41)
AST SERPL-CCNC: 24 U/L — SIGNIFICANT CHANGE UP (ref 0–41)
BASOPHILS # BLD AUTO: 0.03 K/UL — SIGNIFICANT CHANGE UP (ref 0–0.2)
BASOPHILS NFR BLD AUTO: 0.3 % — SIGNIFICANT CHANGE UP (ref 0–1)
BILIRUB SERPL-MCNC: 0.2 MG/DL — SIGNIFICANT CHANGE UP (ref 0.2–1.2)
BILIRUB SERPL-MCNC: 0.5 MG/DL — SIGNIFICANT CHANGE UP (ref 0.2–1.2)
BLD GP AB SCN SERPL QL: SIGNIFICANT CHANGE UP
BUN SERPL-MCNC: 20 MG/DL — SIGNIFICANT CHANGE UP (ref 10–20)
BUN SERPL-MCNC: 25 MG/DL — HIGH (ref 10–20)
CALCIUM SERPL-MCNC: 8.4 MG/DL — LOW (ref 8.5–10.1)
CALCIUM SERPL-MCNC: 9 MG/DL — SIGNIFICANT CHANGE UP (ref 8.5–10.1)
CHLORIDE SERPL-SCNC: 105 MMOL/L — SIGNIFICANT CHANGE UP (ref 98–110)
CHLORIDE SERPL-SCNC: 107 MMOL/L — SIGNIFICANT CHANGE UP (ref 98–110)
CO2 SERPL-SCNC: 20 MMOL/L — SIGNIFICANT CHANGE UP (ref 17–32)
CO2 SERPL-SCNC: 25 MMOL/L — SIGNIFICANT CHANGE UP (ref 17–32)
CREAT SERPL-MCNC: 1.3 MG/DL — SIGNIFICANT CHANGE UP (ref 0.7–1.5)
CREAT SERPL-MCNC: 1.3 MG/DL — SIGNIFICANT CHANGE UP (ref 0.7–1.5)
EOSINOPHIL # BLD AUTO: 0.1 K/UL — SIGNIFICANT CHANGE UP (ref 0–0.7)
EOSINOPHIL NFR BLD AUTO: 0.9 % — SIGNIFICANT CHANGE UP (ref 0–8)
GAS PNL BLDA: SIGNIFICANT CHANGE UP
GLUCOSE BLDC GLUCOMTR-MCNC: 110 MG/DL — HIGH (ref 70–99)
GLUCOSE BLDC GLUCOMTR-MCNC: 134 MG/DL — HIGH (ref 70–99)
GLUCOSE BLDC GLUCOMTR-MCNC: 136 MG/DL — HIGH (ref 70–99)
GLUCOSE BLDC GLUCOMTR-MCNC: 144 MG/DL — HIGH (ref 70–99)
GLUCOSE SERPL-MCNC: 109 MG/DL — HIGH (ref 70–99)
GLUCOSE SERPL-MCNC: 95 MG/DL — SIGNIFICANT CHANGE UP (ref 70–99)
HCT VFR BLD CALC: 29.6 % — LOW (ref 42–52)
HCT VFR BLD CALC: 30.3 % — LOW (ref 42–52)
HCT VFR BLD CALC: 38.2 % — LOW (ref 42–52)
HGB BLD-MCNC: 10.1 G/DL — LOW (ref 14–18)
HGB BLD-MCNC: 10.3 G/DL — LOW (ref 14–18)
HGB BLD-MCNC: 12.8 G/DL — LOW (ref 14–18)
IMM GRANULOCYTES NFR BLD AUTO: 0.8 % — HIGH (ref 0.1–0.3)
INR BLD: 0.95 RATIO — SIGNIFICANT CHANGE UP (ref 0.65–1.3)
INR BLD: 1.2 RATIO — SIGNIFICANT CHANGE UP (ref 0.65–1.3)
LYMPHOCYTES # BLD AUTO: 1.05 K/UL — LOW (ref 1.2–3.4)
LYMPHOCYTES # BLD AUTO: 9.3 % — LOW (ref 20.5–51.1)
MAGNESIUM SERPL-MCNC: 2.1 MG/DL — SIGNIFICANT CHANGE UP (ref 1.8–2.4)
MAGNESIUM SERPL-MCNC: 3.7 MG/DL — CRITICAL HIGH (ref 1.8–2.4)
MCHC RBC-ENTMCNC: 28.4 PG — SIGNIFICANT CHANGE UP (ref 27–31)
MCHC RBC-ENTMCNC: 28.7 PG — SIGNIFICANT CHANGE UP (ref 27–31)
MCHC RBC-ENTMCNC: 28.9 PG — SIGNIFICANT CHANGE UP (ref 27–31)
MCHC RBC-ENTMCNC: 33.5 G/DL — SIGNIFICANT CHANGE UP (ref 32–37)
MCHC RBC-ENTMCNC: 34 G/DL — SIGNIFICANT CHANGE UP (ref 32–37)
MCHC RBC-ENTMCNC: 34.1 G/DL — SIGNIFICANT CHANGE UP (ref 32–37)
MCV RBC AUTO: 84.4 FL — SIGNIFICANT CHANGE UP (ref 80–94)
MCV RBC AUTO: 84.6 FL — SIGNIFICANT CHANGE UP (ref 80–94)
MCV RBC AUTO: 84.7 FL — SIGNIFICANT CHANGE UP (ref 80–94)
MONOCYTES # BLD AUTO: 0.74 K/UL — HIGH (ref 0.1–0.6)
MONOCYTES NFR BLD AUTO: 6.6 % — SIGNIFICANT CHANGE UP (ref 1.7–9.3)
NEUTROPHILS # BLD AUTO: 9.28 K/UL — HIGH (ref 1.4–6.5)
NEUTROPHILS NFR BLD AUTO: 82.1 % — HIGH (ref 42.2–75.2)
NRBC # BLD: 0 /100 WBCS — SIGNIFICANT CHANGE UP (ref 0–0)
PLATELET # BLD AUTO: 153 K/UL — SIGNIFICANT CHANGE UP (ref 130–400)
PLATELET # BLD AUTO: 215 K/UL — SIGNIFICANT CHANGE UP (ref 130–400)
PLATELET # BLD AUTO: 249 K/UL — SIGNIFICANT CHANGE UP (ref 130–400)
POTASSIUM SERPL-MCNC: 4 MMOL/L — SIGNIFICANT CHANGE UP (ref 3.5–5)
POTASSIUM SERPL-MCNC: 4.4 MMOL/L — SIGNIFICANT CHANGE UP (ref 3.5–5)
POTASSIUM SERPL-SCNC: 4 MMOL/L — SIGNIFICANT CHANGE UP (ref 3.5–5)
POTASSIUM SERPL-SCNC: 4.4 MMOL/L — SIGNIFICANT CHANGE UP (ref 3.5–5)
PROT SERPL-MCNC: 5.2 G/DL — LOW (ref 6–8)
PROT SERPL-MCNC: 6.1 G/DL — SIGNIFICANT CHANGE UP (ref 6–8)
PROTHROM AB SERPL-ACNC: 10.9 SEC — SIGNIFICANT CHANGE UP (ref 9.95–12.87)
PROTHROM AB SERPL-ACNC: 13.8 SEC — HIGH (ref 9.95–12.87)
RBC # BLD: 3.5 M/UL — LOW (ref 4.7–6.1)
RBC # BLD: 3.59 M/UL — LOW (ref 4.7–6.1)
RBC # BLD: 4.51 M/UL — LOW (ref 4.7–6.1)
RBC # FLD: 12.4 % — SIGNIFICANT CHANGE UP (ref 11.5–14.5)
RBC # FLD: 12.5 % — SIGNIFICANT CHANGE UP (ref 11.5–14.5)
RBC # FLD: 12.5 % — SIGNIFICANT CHANGE UP (ref 11.5–14.5)
SODIUM SERPL-SCNC: 140 MMOL/L — SIGNIFICANT CHANGE UP (ref 135–146)
SODIUM SERPL-SCNC: 141 MMOL/L — SIGNIFICANT CHANGE UP (ref 135–146)
TYPE + AB SCN PNL BLD: SIGNIFICANT CHANGE UP
WBC # BLD: 11.29 K/UL — HIGH (ref 4.8–10.8)
WBC # BLD: 5.97 K/UL — SIGNIFICANT CHANGE UP (ref 4.8–10.8)
WBC # BLD: 9.68 K/UL — SIGNIFICANT CHANGE UP (ref 4.8–10.8)
WBC # FLD AUTO: 11.29 K/UL — HIGH (ref 4.8–10.8)
WBC # FLD AUTO: 5.97 K/UL — SIGNIFICANT CHANGE UP (ref 4.8–10.8)
WBC # FLD AUTO: 9.68 K/UL — SIGNIFICANT CHANGE UP (ref 4.8–10.8)

## 2019-04-22 PROCEDURE — 93010 ELECTROCARDIOGRAM REPORT: CPT

## 2019-04-22 PROCEDURE — 71045 X-RAY EXAM CHEST 1 VIEW: CPT | Mod: 26

## 2019-04-22 RX ORDER — MEPERIDINE HYDROCHLORIDE 50 MG/ML
25 INJECTION INTRAMUSCULAR; INTRAVENOUS; SUBCUTANEOUS ONCE
Qty: 0 | Refills: 0 | Status: DISCONTINUED | OUTPATIENT
Start: 2019-04-22 | End: 2019-04-23

## 2019-04-22 RX ORDER — CHLORHEXIDINE GLUCONATE 213 G/1000ML
1 SOLUTION TOPICAL
Qty: 0 | Refills: 0 | Status: DISCONTINUED | OUTPATIENT
Start: 2019-04-22 | End: 2019-04-26

## 2019-04-22 RX ORDER — NOREPINEPHRINE BITARTRATE/D5W 8 MG/250ML
0.05 PLASTIC BAG, INJECTION (ML) INTRAVENOUS
Qty: 8 | Refills: 0 | Status: DISCONTINUED | OUTPATIENT
Start: 2019-04-22 | End: 2019-04-23

## 2019-04-22 RX ORDER — DOCUSATE SODIUM 100 MG
100 CAPSULE ORAL THREE TIMES A DAY
Qty: 0 | Refills: 0 | Status: DISCONTINUED | OUTPATIENT
Start: 2019-04-22 | End: 2019-04-26

## 2019-04-22 RX ORDER — SODIUM CHLORIDE 9 MG/ML
1000 INJECTION INTRAMUSCULAR; INTRAVENOUS; SUBCUTANEOUS
Qty: 0 | Refills: 0 | Status: DISCONTINUED | OUTPATIENT
Start: 2019-04-22 | End: 2019-04-25

## 2019-04-22 RX ORDER — PROPOFOL 10 MG/ML
10 INJECTION, EMULSION INTRAVENOUS
Qty: 1000 | Refills: 0 | Status: DISCONTINUED | OUTPATIENT
Start: 2019-04-22 | End: 2019-04-23

## 2019-04-22 RX ORDER — ASPIRIN/CALCIUM CARB/MAGNESIUM 324 MG
325 TABLET ORAL DAILY
Qty: 0 | Refills: 0 | Status: DISCONTINUED | OUTPATIENT
Start: 2019-04-22 | End: 2019-04-26

## 2019-04-22 RX ORDER — INSULIN HUMAN 100 [IU]/ML
1 INJECTION, SOLUTION SUBCUTANEOUS
Qty: 100 | Refills: 0 | Status: DISCONTINUED | OUTPATIENT
Start: 2019-04-22 | End: 2019-04-23

## 2019-04-22 RX ORDER — NITROGLYCERIN 6.5 MG
5 CAPSULE, EXTENDED RELEASE ORAL
Qty: 50 | Refills: 0 | Status: DISCONTINUED | OUTPATIENT
Start: 2019-04-22 | End: 2019-04-23

## 2019-04-22 RX ORDER — POLYETHYLENE GLYCOL 3350 17 G/17G
17 POWDER, FOR SOLUTION ORAL DAILY
Qty: 0 | Refills: 0 | Status: DISCONTINUED | OUTPATIENT
Start: 2019-04-22 | End: 2019-04-26

## 2019-04-22 RX ORDER — ALBUTEROL 90 UG/1
2 AEROSOL, METERED ORAL EVERY 6 HOURS
Qty: 0 | Refills: 0 | Status: DISCONTINUED | OUTPATIENT
Start: 2019-04-22 | End: 2019-04-23

## 2019-04-22 RX ORDER — ONDANSETRON 8 MG/1
4 TABLET, FILM COATED ORAL EVERY 6 HOURS
Qty: 0 | Refills: 0 | Status: DISCONTINUED | OUTPATIENT
Start: 2019-04-22 | End: 2019-04-26

## 2019-04-22 RX ORDER — ALBUMIN HUMAN 25 %
1000 VIAL (ML) INTRAVENOUS ONCE
Qty: 0 | Refills: 0 | Status: COMPLETED | OUTPATIENT
Start: 2019-04-22 | End: 2019-04-22

## 2019-04-22 RX ORDER — OXYCODONE HYDROCHLORIDE 5 MG/1
10 TABLET ORAL EVERY 4 HOURS
Qty: 0 | Refills: 0 | Status: DISCONTINUED | OUTPATIENT
Start: 2019-04-22 | End: 2019-04-26

## 2019-04-22 RX ORDER — NICARDIPINE HYDROCHLORIDE 30 MG/1
5 CAPSULE, EXTENDED RELEASE ORAL
Qty: 40 | Refills: 0 | Status: DISCONTINUED | OUTPATIENT
Start: 2019-04-22 | End: 2019-04-23

## 2019-04-22 RX ORDER — CHLORHEXIDINE GLUCONATE 213 G/1000ML
5 SOLUTION TOPICAL
Qty: 0 | Refills: 0 | Status: DISCONTINUED | OUTPATIENT
Start: 2019-04-22 | End: 2019-04-23

## 2019-04-22 RX ORDER — FENTANYL CITRATE 50 UG/ML
25 INJECTION INTRAVENOUS
Qty: 0 | Refills: 0 | Status: DISCONTINUED | OUTPATIENT
Start: 2019-04-22 | End: 2019-04-22

## 2019-04-22 RX ORDER — DEXMEDETOMIDINE HYDROCHLORIDE IN 0.9% SODIUM CHLORIDE 4 UG/ML
0.25 INJECTION INTRAVENOUS
Qty: 200 | Refills: 0 | Status: DISCONTINUED | OUTPATIENT
Start: 2019-04-22 | End: 2019-04-23

## 2019-04-22 RX ORDER — IPRATROPIUM BROMIDE 0.2 MG/ML
2 SOLUTION, NON-ORAL INHALATION EVERY 6 HOURS
Qty: 0 | Refills: 0 | Status: DISCONTINUED | OUTPATIENT
Start: 2019-04-22 | End: 2019-04-23

## 2019-04-22 RX ORDER — ACETAMINOPHEN 500 MG
650 TABLET ORAL EVERY 6 HOURS
Qty: 0 | Refills: 0 | Status: COMPLETED | OUTPATIENT
Start: 2019-04-22 | End: 2019-04-25

## 2019-04-22 RX ORDER — DEXTROSE 50 % IN WATER 50 %
50 SYRINGE (ML) INTRAVENOUS
Qty: 0 | Refills: 0 | Status: DISCONTINUED | OUTPATIENT
Start: 2019-04-22 | End: 2019-04-26

## 2019-04-22 RX ORDER — DEXTROSE 50 % IN WATER 50 %
25 SYRINGE (ML) INTRAVENOUS
Qty: 0 | Refills: 0 | Status: DISCONTINUED | OUTPATIENT
Start: 2019-04-22 | End: 2019-04-26

## 2019-04-22 RX ORDER — ALBUMIN HUMAN 25 %
500 VIAL (ML) INTRAVENOUS ONCE
Qty: 0 | Refills: 0 | Status: COMPLETED | OUTPATIENT
Start: 2019-04-22 | End: 2019-04-22

## 2019-04-22 RX ORDER — ASPIRIN/CALCIUM CARB/MAGNESIUM 324 MG
300 TABLET ORAL ONCE
Qty: 0 | Refills: 0 | Status: COMPLETED | OUTPATIENT
Start: 2019-04-22 | End: 2019-04-22

## 2019-04-22 RX ORDER — OXYCODONE HYDROCHLORIDE 5 MG/1
5 TABLET ORAL EVERY 4 HOURS
Qty: 0 | Refills: 0 | Status: DISCONTINUED | OUTPATIENT
Start: 2019-04-22 | End: 2019-04-26

## 2019-04-22 RX ORDER — FENTANYL CITRATE 50 UG/ML
25 INJECTION INTRAVENOUS ONCE
Qty: 0 | Refills: 0 | Status: DISCONTINUED | OUTPATIENT
Start: 2019-04-22 | End: 2019-04-22

## 2019-04-22 RX ORDER — MAGNESIUM SULFATE 500 MG/ML
1 VIAL (ML) INJECTION EVERY 12 HOURS
Qty: 0 | Refills: 0 | Status: DISCONTINUED | OUTPATIENT
Start: 2019-04-22 | End: 2019-04-26

## 2019-04-22 RX ORDER — FAMOTIDINE 10 MG/ML
20 INJECTION INTRAVENOUS EVERY 12 HOURS
Qty: 0 | Refills: 0 | Status: DISCONTINUED | OUTPATIENT
Start: 2019-04-22 | End: 2019-04-23

## 2019-04-22 RX ORDER — ONDANSETRON 8 MG/1
4 TABLET, FILM COATED ORAL ONCE
Qty: 0 | Refills: 0 | Status: DISCONTINUED | OUTPATIENT
Start: 2019-04-22 | End: 2019-04-22

## 2019-04-22 RX ORDER — CEFAZOLIN SODIUM 1 G
1000 VIAL (EA) INJECTION EVERY 8 HOURS
Qty: 0 | Refills: 0 | Status: COMPLETED | OUTPATIENT
Start: 2019-04-22 | End: 2019-04-23

## 2019-04-22 RX ADMIN — Medication 500 MILLILITER(S): at 16:43

## 2019-04-22 RX ADMIN — CHLORHEXIDINE GLUCONATE 10 MILLILITER(S): 213 SOLUTION TOPICAL at 05:46

## 2019-04-22 RX ADMIN — FENTANYL CITRATE 25 MICROGRAM(S): 50 INJECTION INTRAVENOUS at 16:00

## 2019-04-22 RX ADMIN — ALBUTEROL 2 PUFF(S): 90 AEROSOL, METERED ORAL at 19:29

## 2019-04-22 RX ADMIN — CHLORHEXIDINE GLUCONATE 5 MILLILITER(S): 213 SOLUTION TOPICAL at 18:08

## 2019-04-22 RX ADMIN — Medication 100 MILLIGRAM(S): at 17:48

## 2019-04-22 RX ADMIN — FENTANYL CITRATE 25 MICROGRAM(S): 50 INJECTION INTRAVENOUS at 21:15

## 2019-04-22 RX ADMIN — FENTANYL CITRATE 25 MICROGRAM(S): 50 INJECTION INTRAVENOUS at 16:30

## 2019-04-22 RX ADMIN — ONDANSETRON 4 MILLIGRAM(S): 8 TABLET, FILM COATED ORAL at 23:19

## 2019-04-22 RX ADMIN — FENTANYL CITRATE 25 MICROGRAM(S): 50 INJECTION INTRAVENOUS at 15:00

## 2019-04-22 RX ADMIN — Medication 650 MILLIGRAM(S): at 19:40

## 2019-04-22 RX ADMIN — FENTANYL CITRATE 25 MICROGRAM(S): 50 INJECTION INTRAVENOUS at 18:15

## 2019-04-22 RX ADMIN — Medication 650 MILLIGRAM(S): at 19:09

## 2019-04-22 RX ADMIN — FENTANYL CITRATE 25 MICROGRAM(S): 50 INJECTION INTRAVENOUS at 20:45

## 2019-04-22 RX ADMIN — CHLORHEXIDINE GLUCONATE 1 APPLICATION(S): 213 SOLUTION TOPICAL at 05:47

## 2019-04-22 RX ADMIN — Medication 325 MILLIGRAM(S): at 21:13

## 2019-04-22 RX ADMIN — Medication 250 MILLILITER(S): at 17:45

## 2019-04-22 RX ADMIN — FENTANYL CITRATE 25 MICROGRAM(S): 50 INJECTION INTRAVENOUS at 18:30

## 2019-04-22 RX ADMIN — SODIUM CHLORIDE 3 MILLILITER(S): 9 INJECTION INTRAMUSCULAR; INTRAVENOUS; SUBCUTANEOUS at 05:47

## 2019-04-22 RX ADMIN — FAMOTIDINE 20 MILLIGRAM(S): 10 INJECTION INTRAVENOUS at 18:08

## 2019-04-22 NOTE — DISCHARGE NOTE PROVIDER - NSDCCPTREATMENT_GEN_ALL_CORE_FT
PRINCIPAL PROCEDURE  Procedure: CABG, 2 arterial and 1 venous  Findings and Treatment: Lima to LAD, left radial to ramus and SVG to PDA

## 2019-04-22 NOTE — DISCHARGE NOTE PROVIDER - CARE PROVIDER_API CALL
Carlos Cr)  Cardiology; Internal Medicine  [address] [place] [address], [address]  [place] [address]  Phone: [phone]  Fax: [phone]  Follow Up Time:     Cesar Clemente)  Surgery; Thoracic and Cardiac Surgery  [address] [place] [address], [address]  [place], [place] [id number]  Phone: [phone]  Fax: [phone]  Follow Up Time:

## 2019-04-22 NOTE — DISCHARGE NOTE PROVIDER - NSDCACTIVITY_GEN_ALL_CORE
Do not drive or operate machinery/Walking - Indoors allowed/Stairs allowed/Walking - Outdoors allowed/Showering allowed/Do not make important decisions/No heavy lifting/straining

## 2019-04-22 NOTE — PRE-ANESTHESIA EVALUATION ADULT - NSANTHOSAYNRD_GEN_A_CORE
No. SHRUTI screening performed.  STOP BANG Legend: 0-2 = LOW Risk; 3-4 = INTERMEDIATE Risk; 5-8 = HIGH Risk

## 2019-04-22 NOTE — BRIEF OPERATIVE NOTE - NSICDXBRIEFPROCEDURE_GEN_ALL_CORE_FT
PROCEDURES:  CABG, 2 arterial and 1 venous 22-Apr-2019 13:48:22 Lima to LAD, left radial to ramus and SVG to PDA Luis Maza
PROCEDURES:  CABG, 2 arterial and 1 venous 22-Apr-2019 13:48:22 Lima to LAD, left radial to ramus and SVG to PDA Luis Maza

## 2019-04-22 NOTE — DISCHARGE NOTE PROVIDER - HOSPITAL COURSE
62 yo M h/o HTN, bladder cancer and right hip replacement has been c/o chest pain had + stress test [place] an [place]pt [place] admitted after elective cardiac cath, which revealed severe CAD with Left main involvement. Patient admitted post-procedure for myocardial revascularization via CABG on 4/22/19 after medical optimization. His post-op course was 62 yo M h/o HTN, bladder cancer and right hip replacement has been c/o chest pain had + stress test [place] an outpt [place] admitted after elective cardiac cath, which revealed severe CAD with Left main involvement. Patient admitted post-procedure for myocardial revascularization via CABG on 4/22/19 after medical optimization. His post-op course was uncomplicated.  The patient was sent home in stable condition on Lasix for 3 days.

## 2019-04-22 NOTE — BRIEF OPERATIVE NOTE - NSICDXBRIEFPREOP_GEN_ALL_CORE_FT
PRE-OP DIAGNOSIS:  Coronary artery disease with exertional angina 22-Apr-2019 13:49:40  Luis Maza
PRE-OP DIAGNOSIS:  Coronary artery disease with exertional angina 22-Apr-2019 13:49:40  Luis Maza

## 2019-04-22 NOTE — DISCHARGE NOTE PROVIDER - NSDCCPCAREPLAN_GEN_ALL_CORE_FT
PRINCIPAL DISCHARGE DIAGNOSIS  Diagnosis: Coronary artery disease with exertional angina  Assessment and Plan of Treatment:

## 2019-04-22 NOTE — PRE-ANESTHESIA EVALUATION ADULT - NSANTHPROCED_GEN_ALL_CORE
Pulmonary Artery Catheter/Transesophageal Echocardiogram/Arterial Catheter/Central Venous Catheter
Arterial Catheter/Pulmonary Artery Catheter/Transesophageal Echocardiogram/Central Venous Catheter

## 2019-04-22 NOTE — BRIEF OPERATIVE NOTE - NSICDXBRIEFPOSTOP_GEN_ALL_CORE_FT
POST-OP DIAGNOSIS:  Coronary artery disease with exertional angina 22-Apr-2019 13:49:59  Luis Maza
POST-OP DIAGNOSIS:  Coronary artery disease with exertional angina 22-Apr-2019 13:49:59  Luis Maza

## 2019-04-23 LAB
ALBUMIN SERPL ELPH-MCNC: 4.6 G/DL — SIGNIFICANT CHANGE UP (ref 3.5–5.2)
ALP SERPL-CCNC: 30 U/L — SIGNIFICANT CHANGE UP (ref 30–115)
ALT FLD-CCNC: 10 U/L — SIGNIFICANT CHANGE UP (ref 0–41)
ANION GAP SERPL CALC-SCNC: 11 MMOL/L — SIGNIFICANT CHANGE UP (ref 7–14)
APTT BLD: 31.8 SEC — SIGNIFICANT CHANGE UP (ref 27–39.2)
AST SERPL-CCNC: 36 U/L — SIGNIFICANT CHANGE UP (ref 0–41)
BASE EXCESS BLDV CALC-SCNC: -3 MMOL/L — LOW (ref -2–2)
BASE EXCESS BLDV CALC-SCNC: -3 MMOL/L — LOW (ref -2–2)
BASOPHILS # BLD AUTO: 0.01 K/UL — SIGNIFICANT CHANGE UP (ref 0–0.2)
BASOPHILS NFR BLD AUTO: 0.1 % — SIGNIFICANT CHANGE UP (ref 0–1)
BILIRUB SERPL-MCNC: 0.6 MG/DL — SIGNIFICANT CHANGE UP (ref 0.2–1.2)
BUN SERPL-MCNC: 24 MG/DL — HIGH (ref 10–20)
CA-I SERPL-SCNC: 1.13 MMOL/L — SIGNIFICANT CHANGE UP (ref 1.12–1.3)
CA-I SERPL-SCNC: 1.13 MMOL/L — SIGNIFICANT CHANGE UP (ref 1.12–1.3)
CALCIUM SERPL-MCNC: 8.2 MG/DL — LOW (ref 8.5–10.1)
CHLORIDE SERPL-SCNC: 110 MMOL/L — SIGNIFICANT CHANGE UP (ref 98–110)
CO2 SERPL-SCNC: 21 MMOL/L — SIGNIFICANT CHANGE UP (ref 17–32)
CREAT SERPL-MCNC: 1.3 MG/DL — SIGNIFICANT CHANGE UP (ref 0.7–1.5)
EOSINOPHIL # BLD AUTO: 0 K/UL — SIGNIFICANT CHANGE UP (ref 0–0.7)
EOSINOPHIL NFR BLD AUTO: 0 % — SIGNIFICANT CHANGE UP (ref 0–8)
GAS PNL BLDA: SIGNIFICANT CHANGE UP
GAS PNL BLDV: 141 MMOL/L — SIGNIFICANT CHANGE UP (ref 136–145)
GAS PNL BLDV: 141 MMOL/L — SIGNIFICANT CHANGE UP (ref 136–145)
GAS PNL BLDV: SIGNIFICANT CHANGE UP
GAS PNL BLDV: SIGNIFICANT CHANGE UP
GLUCOSE BLDC GLUCOMTR-MCNC: 112 MG/DL — HIGH (ref 70–99)
GLUCOSE BLDC GLUCOMTR-MCNC: 117 MG/DL — HIGH (ref 70–99)
GLUCOSE BLDC GLUCOMTR-MCNC: 119 MG/DL — HIGH (ref 70–99)
GLUCOSE BLDC GLUCOMTR-MCNC: 121 MG/DL — HIGH (ref 70–99)
GLUCOSE BLDC GLUCOMTR-MCNC: 127 MG/DL — HIGH (ref 70–99)
GLUCOSE BLDC GLUCOMTR-MCNC: 140 MG/DL — HIGH (ref 70–99)
GLUCOSE BLDC GLUCOMTR-MCNC: 142 MG/DL — HIGH (ref 70–99)
GLUCOSE SERPL-MCNC: 135 MG/DL — HIGH (ref 70–99)
HCO3 BLDV-SCNC: 23 MMOL/L — SIGNIFICANT CHANGE UP (ref 22–29)
HCO3 BLDV-SCNC: 23 MMOL/L — SIGNIFICANT CHANGE UP (ref 22–29)
HCT VFR BLD CALC: 26 % — LOW (ref 42–52)
HCT VFR BLDA CALC: 27.2 % — LOW (ref 34–44)
HCT VFR BLDA CALC: 27.2 % — LOW (ref 34–44)
HGB BLD CALC-MCNC: 8.9 G/DL — LOW (ref 14–18)
HGB BLD CALC-MCNC: 8.9 G/DL — LOW (ref 14–18)
HGB BLD-MCNC: 8.7 G/DL — LOW (ref 14–18)
IMM GRANULOCYTES NFR BLD AUTO: 0.4 % — HIGH (ref 0.1–0.3)
INR BLD: 1.22 RATIO — SIGNIFICANT CHANGE UP (ref 0.65–1.3)
LACTATE BLDV-MCNC: 0.7 MMOL/L — SIGNIFICANT CHANGE UP (ref 0.5–1.6)
LACTATE BLDV-MCNC: 0.7 MMOL/L — SIGNIFICANT CHANGE UP (ref 0.5–1.6)
LYMPHOCYTES # BLD AUTO: 0.44 K/UL — LOW (ref 1.2–3.4)
LYMPHOCYTES # BLD AUTO: 4.1 % — LOW (ref 20.5–51.1)
MAGNESIUM SERPL-MCNC: 2.7 MG/DL — HIGH (ref 1.8–2.4)
MCHC RBC-ENTMCNC: 28.6 PG — SIGNIFICANT CHANGE UP (ref 27–31)
MCHC RBC-ENTMCNC: 33.5 G/DL — SIGNIFICANT CHANGE UP (ref 32–37)
MCV RBC AUTO: 85.5 FL — SIGNIFICANT CHANGE UP (ref 80–94)
MONOCYTES # BLD AUTO: 0.72 K/UL — HIGH (ref 0.1–0.6)
MONOCYTES NFR BLD AUTO: 6.6 % — SIGNIFICANT CHANGE UP (ref 1.7–9.3)
NEUTROPHILS # BLD AUTO: 9.63 K/UL — HIGH (ref 1.4–6.5)
NEUTROPHILS NFR BLD AUTO: 88.8 % — HIGH (ref 42.2–75.2)
NRBC # BLD: 0 /100 WBCS — SIGNIFICANT CHANGE UP (ref 0–0)
PCO2 BLDV: 45 MMHG — SIGNIFICANT CHANGE UP (ref 41–51)
PCO2 BLDV: 45 MMHG — SIGNIFICANT CHANGE UP (ref 41–51)
PH BLDV: 7.32 — SIGNIFICANT CHANGE UP (ref 7.26–7.43)
PH BLDV: 7.32 — SIGNIFICANT CHANGE UP (ref 7.26–7.43)
PLATELET # BLD AUTO: 134 K/UL — SIGNIFICANT CHANGE UP (ref 130–400)
PO2 BLDV: 35 MMHG — SIGNIFICANT CHANGE UP (ref 20–40)
PO2 BLDV: 35 MMHG — SIGNIFICANT CHANGE UP (ref 20–40)
POTASSIUM BLDV-SCNC: 3.7 MMOL/L — SIGNIFICANT CHANGE UP (ref 3.3–5.6)
POTASSIUM BLDV-SCNC: 3.7 MMOL/L — SIGNIFICANT CHANGE UP (ref 3.3–5.6)
POTASSIUM SERPL-MCNC: 4.5 MMOL/L — SIGNIFICANT CHANGE UP (ref 3.5–5)
POTASSIUM SERPL-SCNC: 4.5 MMOL/L — SIGNIFICANT CHANGE UP (ref 3.5–5)
PROT SERPL-MCNC: 5.7 G/DL — LOW (ref 6–8)
PROTHROM AB SERPL-ACNC: 14 SEC — HIGH (ref 9.95–12.87)
RBC # BLD: 3.04 M/UL — LOW (ref 4.7–6.1)
RBC # FLD: 12.8 % — SIGNIFICANT CHANGE UP (ref 11.5–14.5)
SAO2 % BLDV: 67 % — SIGNIFICANT CHANGE UP
SAO2 % BLDV: 67 % — SIGNIFICANT CHANGE UP
SODIUM SERPL-SCNC: 142 MMOL/L — SIGNIFICANT CHANGE UP (ref 135–146)
WBC # BLD: 10.84 K/UL — HIGH (ref 4.8–10.8)
WBC # FLD AUTO: 10.84 K/UL — HIGH (ref 4.8–10.8)

## 2019-04-23 PROCEDURE — 71045 X-RAY EXAM CHEST 1 VIEW: CPT | Mod: 26

## 2019-04-23 PROCEDURE — 71045 X-RAY EXAM CHEST 1 VIEW: CPT | Mod: 26,77

## 2019-04-23 RX ORDER — FENTANYL CITRATE 50 UG/ML
25 INJECTION INTRAVENOUS ONCE
Qty: 0 | Refills: 0 | Status: DISCONTINUED | OUTPATIENT
Start: 2019-04-23 | End: 2019-04-23

## 2019-04-23 RX ORDER — DEXTROSE 50 % IN WATER 50 %
15 SYRINGE (ML) INTRAVENOUS ONCE
Qty: 0 | Refills: 0 | Status: DISCONTINUED | OUTPATIENT
Start: 2019-04-23 | End: 2019-04-26

## 2019-04-23 RX ORDER — HEPARIN SODIUM 5000 [USP'U]/ML
5000 INJECTION INTRAVENOUS; SUBCUTANEOUS EVERY 8 HOURS
Qty: 0 | Refills: 0 | Status: DISCONTINUED | OUTPATIENT
Start: 2019-04-23 | End: 2019-04-26

## 2019-04-23 RX ORDER — FAMOTIDINE 10 MG/ML
20 INJECTION INTRAVENOUS
Qty: 0 | Refills: 0 | Status: DISCONTINUED | OUTPATIENT
Start: 2019-04-23 | End: 2019-04-26

## 2019-04-23 RX ORDER — METOPROLOL TARTRATE 50 MG
12.5 TABLET ORAL
Qty: 0 | Refills: 0 | Status: DISCONTINUED | OUTPATIENT
Start: 2019-04-23 | End: 2019-04-24

## 2019-04-23 RX ORDER — IPRATROPIUM/ALBUTEROL SULFATE 18-103MCG
3 AEROSOL WITH ADAPTER (GRAM) INHALATION EVERY 6 HOURS
Qty: 0 | Refills: 0 | Status: DISCONTINUED | OUTPATIENT
Start: 2019-04-23 | End: 2019-04-26

## 2019-04-23 RX ORDER — SODIUM CHLORIDE 9 MG/ML
1000 INJECTION, SOLUTION INTRAVENOUS
Qty: 0 | Refills: 0 | Status: DISCONTINUED | OUTPATIENT
Start: 2019-04-23 | End: 2019-04-26

## 2019-04-23 RX ORDER — ISOSORBIDE DINITRATE 5 MG/1
5 TABLET ORAL THREE TIMES A DAY
Qty: 0 | Refills: 0 | Status: DISCONTINUED | OUTPATIENT
Start: 2019-04-23 | End: 2019-04-25

## 2019-04-23 RX ORDER — GLUCAGON INJECTION, SOLUTION 0.5 MG/.1ML
1 INJECTION, SOLUTION SUBCUTANEOUS ONCE
Qty: 0 | Refills: 0 | Status: DISCONTINUED | OUTPATIENT
Start: 2019-04-23 | End: 2019-04-26

## 2019-04-23 RX ORDER — ATORVASTATIN CALCIUM 80 MG/1
40 TABLET, FILM COATED ORAL AT BEDTIME
Qty: 0 | Refills: 0 | Status: DISCONTINUED | OUTPATIENT
Start: 2019-04-23 | End: 2019-04-26

## 2019-04-23 RX ORDER — INSULIN LISPRO 100/ML
VIAL (ML) SUBCUTANEOUS
Qty: 0 | Refills: 0 | Status: DISCONTINUED | OUTPATIENT
Start: 2019-04-23 | End: 2019-04-24

## 2019-04-23 RX ADMIN — ISOSORBIDE DINITRATE 5 MILLIGRAM(S): 5 TABLET ORAL at 21:07

## 2019-04-23 RX ADMIN — Medication 100 MILLIGRAM(S): at 05:22

## 2019-04-23 RX ADMIN — FENTANYL CITRATE 25 MICROGRAM(S): 50 INJECTION INTRAVENOUS at 03:56

## 2019-04-23 RX ADMIN — Medication 100 GRAM(S): at 05:21

## 2019-04-23 RX ADMIN — POLYETHYLENE GLYCOL 3350 17 GRAM(S): 17 POWDER, FOR SOLUTION ORAL at 11:10

## 2019-04-23 RX ADMIN — Medication 325 MILLIGRAM(S): at 11:09

## 2019-04-23 RX ADMIN — HEPARIN SODIUM 5000 UNIT(S): 5000 INJECTION INTRAVENOUS; SUBCUTANEOUS at 21:07

## 2019-04-23 RX ADMIN — Medication 650 MILLIGRAM(S): at 18:15

## 2019-04-23 RX ADMIN — Medication 650 MILLIGRAM(S): at 11:10

## 2019-04-23 RX ADMIN — Medication 100 MILLIGRAM(S): at 21:07

## 2019-04-23 RX ADMIN — Medication 600 MILLIGRAM(S): at 17:28

## 2019-04-23 RX ADMIN — Medication 650 MILLIGRAM(S): at 06:36

## 2019-04-23 RX ADMIN — Medication 100 MILLIGRAM(S): at 00:14

## 2019-04-23 RX ADMIN — Medication 650 MILLIGRAM(S): at 01:15

## 2019-04-23 RX ADMIN — OXYCODONE HYDROCHLORIDE 10 MILLIGRAM(S): 5 TABLET ORAL at 08:30

## 2019-04-23 RX ADMIN — Medication 650 MILLIGRAM(S): at 07:10

## 2019-04-23 RX ADMIN — Medication 650 MILLIGRAM(S): at 00:46

## 2019-04-23 RX ADMIN — Medication 600 MILLIGRAM(S): at 05:21

## 2019-04-23 RX ADMIN — Medication 12.5 MILLIGRAM(S): at 17:28

## 2019-04-23 RX ADMIN — OXYCODONE HYDROCHLORIDE 5 MILLIGRAM(S): 5 TABLET ORAL at 05:50

## 2019-04-23 RX ADMIN — FENTANYL CITRATE 25 MICROGRAM(S): 50 INJECTION INTRAVENOUS at 04:30

## 2019-04-23 RX ADMIN — OXYCODONE HYDROCHLORIDE 5 MILLIGRAM(S): 5 TABLET ORAL at 05:22

## 2019-04-23 RX ADMIN — OXYCODONE HYDROCHLORIDE 10 MILLIGRAM(S): 5 TABLET ORAL at 07:44

## 2019-04-23 RX ADMIN — Medication 100 MILLIGRAM(S): at 10:41

## 2019-04-23 RX ADMIN — CHLORHEXIDINE GLUCONATE 5 MILLILITER(S): 213 SOLUTION TOPICAL at 05:21

## 2019-04-23 RX ADMIN — ONDANSETRON 4 MILLIGRAM(S): 8 TABLET, FILM COATED ORAL at 13:55

## 2019-04-23 RX ADMIN — FAMOTIDINE 20 MILLIGRAM(S): 10 INJECTION INTRAVENOUS at 05:21

## 2019-04-23 RX ADMIN — ONDANSETRON 4 MILLIGRAM(S): 8 TABLET, FILM COATED ORAL at 07:39

## 2019-04-23 RX ADMIN — ISOSORBIDE DINITRATE 5 MILLIGRAM(S): 5 TABLET ORAL at 13:22

## 2019-04-23 RX ADMIN — Medication 100 MILLIGRAM(S): at 13:22

## 2019-04-23 RX ADMIN — FAMOTIDINE 20 MILLIGRAM(S): 10 INJECTION INTRAVENOUS at 17:29

## 2019-04-23 RX ADMIN — CHLORHEXIDINE GLUCONATE 1 APPLICATION(S): 213 SOLUTION TOPICAL at 05:21

## 2019-04-23 RX ADMIN — Medication 12.5 MILLIGRAM(S): at 10:49

## 2019-04-23 RX ADMIN — Medication 650 MILLIGRAM(S): at 17:28

## 2019-04-23 RX ADMIN — ATORVASTATIN CALCIUM 40 MILLIGRAM(S): 80 TABLET, FILM COATED ORAL at 21:07

## 2019-04-23 RX ADMIN — Medication 100 GRAM(S): at 17:29

## 2019-04-23 NOTE — PROGRESS NOTE ADULT - SUBJECTIVE AND OBJECTIVE BOX
OPERATIVE PROCEDURE(s):                POD #                       SURGEON(s): THERESE Clemente  SUBJECTIVE ASSESSMENT:63yMale patient seen and examined at bedside.    Vital Signs Last 24 Hrs  T(F): 98.8 (2019 08:00), Max: 98.8 (2019 08:00)  HR: 80 (2019 09:00) (72 - 90)  BP: 131/68 (2019 07:30) (103/55 - 131/68)  BP(mean): 86 (2019 07:30) (67 - 86)  ABP: 130/56 (2019 09:00) (94/46 - 136/60)  ABP(mean): 80 (2019 09:00)  RR: 25 (2019 09:00) (0 - 26)  SpO2: 96% (2019 09:00) (94% - 100%)    CVP(mm Hg): 8 (2019 08:30)  CO: 7.3 (2019 08:00)  CI: 3.7 (2019 08:00)  PA: 27/5 (2019 08:45)  SVR: 788 (2019 08:00)    Mode: CPAP with PS  FiO2: 50  PEEP: 5  PS: 7    I&O's Detail    2019 07:  -  2019 07:00  --------------------------------------------------------  IN:    Albumin 5%  - 500 mL: 1500 mL    dexmedetomidine Infusion: 16 mL    insulin Infusion: 20 mL    IV PiggyBack: 700 mL    niCARdipine Infusion: 10 mL    nitroglycerin  Infusion: 66.1 mL    norepinephrine Infusion: 3.5 mL    Oral Fluid: 150 mL    sodium chloride 0.9%.: 360 mL  Total IN: 2825.6 mL    OUT:    Bulb: 25 mL    Chest Tube: 160 mL    Chest Tube: 500 mL    Ureteral Catheter: 1328 mL  Total OUT: 2013 mL    Net: I&O's Detail    2019 07:01  -  2019 07:00  --------------------------------------------------------  Total NET: -480 mL    2019 07:01  -  2019 07:00  --------------------------------------------------------  Total NET: 812.6 mL    CAPILLARY BLOOD GLUCOSE  136 (2019 15:00)  114 (2019 14:05)      POCT Blood Glucose.: 112 mg/dL (2019 06:23)  POCT Blood Glucose.: 119 mg/dL (2019 03:29)  POCT Blood Glucose.: 140 mg/dL (2019 01:36)  POCT Blood Glucose.: 134 mg/dL (2019 23:05)  POCT Blood Glucose.: 110 mg/dL (2019 21:31)  POCT Blood Glucose.: 144 mg/dL (2019 16:36)  POCT Blood Glucose.: 136 mg/dL (2019 15:14)      Physical Exam:  General: NAD; A&Ox3  Cardiac: S1/S2, RRR, no murmur, no rubs  Lungs: unlabored respirations, CTA b/l, no wheeze, no rales, no crackles  Abdomen: Soft/NT/ND; positive bowel sounds x 4  Sternum: Intact, no click, incision healing well with no drainage  Incisions: Incisions clean/dry/intact  Extremities: No edema b/l lower extremities; good capillary refill; no cyanosis; palpable 1+ pedal pulses b/l    Central Venous Catheter: Yes[]  No[] , If Yes indication:                    Day #  Hollis Catheter: Yes  [] , No  [] , If yes indication:                                 Day #  NGT: Yes [] No [] ,    If Yes Placement:                                                   Day #  EPICARDIAL WIRES:  [] YES [] NO                                                            Day #  BOWEL MOVEMENT:  [] YES [] NO, If No, Timing since last BM Day #  CHEST TUBE(Left/Right):  [] YES [] NO, If yes -  AIR LEAKS:  [] YES [] NO        LABS:                        8.7<L>  10.84<H> )-----------( 134      ( 2019 01:35 )             26.0<L>                        10.1<L>  11.29<H> )-----------( 153      ( 2019 14:11 )             29.6<L>    23    142  |  110  |  24<H>  ----------------------------<  135<H>  4.5   |  21  |  1.3  22    140  |  107  |  20  ----------------------------<  109<H>  4.4   |  20  |  1.3    Ca    8.2<L>      2019 01:35  Mg     2.7     23    TPro  5.7<L> [6.0 - 8.0]  /  Alb  4.6 [3.5 - 5.2]  /  TBili  0.6 [0.2 - 1.2]  /  DBili  x   /  AST  36 [0 - 41]  /  ALT  10 [0 - 41]  /  AlkPhos  30 [30 - 115]  23    PT/INR - ( 2019 01:35 )   PT: ;   INR: 1.22 ratio       PT/INR - ( 2019 14:11 )   PT: ;   INR: 1.20 ratio       PTT - ( 2019 01:35 )  PTT:31.8 sec, PTT - ( 2019 14:11 )  PTT:32.9 sec    Urinalysis Basic - ( 2019 20:05 )  Color: Yellow / Appearance: Clear / S.020 / pH: x  Gluc: x / Ketone: Negative  / Bili: Negative / Urobili: 0.2 mg/dL   Blood: x / Protein: Negative mg/dL / Nitrite: Negative   Leuk Esterase: Negative / RBC: 11-25 /HPF / WBC 1-2 /HPF   Sq Epi: x / Non Sq Epi: x / Bacteria: Few /HPF      ABG - ( 2019 04:13 )  pH: 7.35  /  pCO2: 42    /  pO2: 95    / HCO3: 23    / Base Excess: -2.4  /  SaO2: 98    /  LA: 0.8        RADIOLOGY & ADDITIONAL TESTS:  CXR:   EKG:      Allergies  No Known Allergies  Intolerances      MEDICATIONS  (STANDING):  acetaminophen   Tablet .. 650 milliGRAM(s) Oral every 6 hours  ALBUTerol    90 MICROgram(s) HFA Inhaler 2 Puff(s) Inhalation every 6 hours  aspirin enteric coated 325 milliGRAM(s) Oral daily  ceFAZolin   IVPB 1000 milliGRAM(s) IV Intermittent every 8 hours  chlorhexidine 0.12% Liquid 5 milliLiter(s) Oral Mucosa two times a day  chlorhexidine 4% Liquid 1 Application(s) Topical <User Schedule>  dexmedetomidine Infusion 0.25 MICROgram(s)/kG/Hr (4.619 mL/Hr) IV Continuous <Continuous>  dextrose 50% Injectable 50 milliLiter(s) IV Push every 15 minutes  dextrose 50% Injectable 25 milliLiter(s) IV Push every 15 minutes  docusate sodium 100 milliGRAM(s) Oral three times a day  famotidine Injectable 20 milliGRAM(s) IV Push every 12 hours  guaiFENesin  milliGRAM(s) Oral every 12 hours  insulin Infusion 1 Unit(s)/Hr (1 mL/Hr) IV Continuous <Continuous>  ipratropium 17 MICROgram(s) HFA Inhaler 2 Puff(s) Inhalation every 6 hours  magnesium sulfate  IVPB 1 Gram(s) IV Intermittent every 12 hours  meperidine     Injectable 25 milliGRAM(s) IV Push once  niCARdipine Infusion 5 mG/Hr (25 mL/Hr) IV Continuous <Continuous>  nitroglycerin  Infusion 5 MICROgram(s)/Min (1.5 mL/Hr) IV Continuous <Continuous>  norepinephrine Infusion 0.05 MICROgram(s)/kG/Min (6.928 mL/Hr) IV Continuous <Continuous>  polyethylene glycol 3350 17 Gram(s) Oral daily  propofol Infusion 10 MICROgram(s)/kG/Min (4.434 mL/Hr) IV Continuous <Continuous>  sodium chloride 0.9%. 1000 milliLiter(s) (20 mL/Hr) IV Continuous <Continuous>    MEDICATIONS  (PRN):  ondansetron Injectable 4 milliGRAM(s) IV Push every 6 hours PRN Nausea and/or Vomiting  oxyCODONE    IR 10 milliGRAM(s) Oral every 4 hours PRN Severe Pain (7 - 10)  oxyCODONE    IR 5 milliGRAM(s) Oral every 4 hours PRN Moderate Pain (4 - 6)    Home Medications:  aspirin 81 mg oral tablet, chewable: 1 tab(s) orally once a day (2019 08:06)  irbesartan 150 mg oral tablet: 1 tab(s) orally once a day (2019 08:06)    Pharmacologic DVT Prophylaxis [] YES, [] NO: Contraindication:  [] HEPARIN: Dose: XX mg  Q24H     [] LOVENOX: Dose: XX mg  Q24H  SCD's: YES b/l    GI Prophylaxis: Protonix [], Pepcid []    Post-Op Beta-Blockers: [] Yes, [] No: contraindication:  Post-Op Nitrate: [] Yes, [] No: contraindication:  Post-Op Aspirin:  [] Yes, [] No: contraindication:  Post-Op Statin:  [] Yes, [] No: contraindication:    Ambulation/Activity Status:    Assessment/Plan:  63y Male status-post  - Case and plan discussed with CTU Intensivist and CT Surgeon - Dr. Giron/Autumn/Chyna   - Continue CTU supportive care and ongoing plan of care as per continuing CTU rounds.   - Continue DVT/GI prophylaxis  - Incentive Spirometry 10 times an hour  - Continue to advance physical activity as tolerated and continue PT/OT as directed  1. CAD: Continue ASA, statin, BB  2. HTN:   3. A. Fib:   4. COPD/Hypoxia:   5. DM/Glucose Control:     Social Service Disposition: OPERATIVE PROCEDURE(s):  CABG3 w/L radial               POD #    1                   SURGEON(s): THERESE Clemente    HPI: 64 yo M h/o HTN has been c/o chest pain had + [place] cardiac cath (19 Apr 2019 09:38)  Cardiac cath reveals significant left main and LAD disease.    SUBJECTIVE ASSESSMENT: 63y Male patient seen and examined at bedside. Patient denies any acute complaints at this time.     Vital Signs Last 24 Hrs  T(F): 98.8 (23 Apr 2019 08:00), Max: 98.8 (23 Apr 2019 08:00)  HR: 80 (23 Apr 2019 09:00) (72 - 90)  BP: 131/68 (23 Apr 2019 07:30) (103/55 - 131/68)  BP(mean): 86 (23 Apr 2019 07:30) (67 - 86)  ABP: 130/56 (23 Apr 2019 09:00) (94/46 - 136/60)  ABP(mean): 80 (23 Apr 2019 09:00)  RR: 25 (23 Apr 2019 09:00) (0 - 26)  SpO2: 96% (23 Apr 2019 09:00) (94% - 100%)    CVP(mm Hg): 8 (23 Apr 2019 08:30)  CO: 7.3 (23 Apr 2019 08:00)  CI: 3.7 (23 Apr 2019 08:00)  PA: 27/5 (23 Apr 2019 08:45)  SVR: 788 (23 Apr 2019 08:00)    Mode: CPAP with PS  FiO2: 50  PEEP: 5  PS: 7    I&O's Detail    22 Apr 2019 07:01  -  23 Apr 2019 07:00  --------------------------------------------------------  IN:    Albumin 5%  - 500 mL: 1500 mL    dexmedetomidine Infusion: 16 mL    insulin Infusion: 20 mL    IV PiggyBack: 700 mL    niCARdipine Infusion: 10 mL    nitroglycerin  Infusion: 66.1 mL    norepinephrine Infusion: 3.5 mL    Oral Fluid: 150 mL    sodium chloride 0.9%.: 360 mL  Total IN: 2825.6 mL    OUT:    Bulb: 25 mL    Chest Tube: 160 mL    Chest Tube: 500 mL    Ureteral Catheter: 1328 mL  Total OUT: 2013 mL    Net: I&O's Detail    21 Apr 2019 07:01  -  22 Apr 2019 07:00  --------------------------------------------------------  Total NET: -480 mL    22 Apr 2019 07:01  -  23 Apr 2019 07:00  --------------------------------------------------------  Total NET: 812.6 mL    CAPILLARY BLOOD GLUCOSE  POCT Blood Glucose.: 112 mg/dL (23 Apr 2019 06:23)  POCT Blood Glucose.: 119 mg/dL (23 Apr 2019 03:29)  POCT Blood Glucose.: 140 mg/dL (23 Apr 2019 01:36)  POCT Blood Glucose.: 134 mg/dL (22 Apr 2019 23:05)  POCT Blood Glucose.: 110 mg/dL (22 Apr 2019 21:31)  POCT Blood Glucose.: 144 mg/dL (22 Apr 2019 16:36)  POCT Blood Glucose.: 136 mg/dL (22 Apr 2019 15:14)      Physical Exam:  General: NAD; A&Ox3  Cardiac: S1/S2, RRR, no murmur, no rubs  Lungs: unlabored respirations, CTA b/l, no wheeze, no rales, no crackles  Abdomen: Soft/NT/ND; positive bowel sounds x 4  Sternum: Intact, no click, incision healing well with no drainage  Incisions: Incisions clean/dry/intact  Extremities: No edema b/l lower extremities; good capillary refill; no cyanosis; palpable 1+ pedal pulses b/l        LABS:                        8.7<L>  10.84<H> )-----------( 134      ( 23 Apr 2019 01:35 )             26.0<L>                        10.1<L>  11.29<H> )-----------( 153      ( 22 Apr 2019 14:11 )             29.6<L>    04-23    142  |  110  |  24<H>  ----------------------------<  135<H>  4.5   |  21  |  1.3    04-22    140  |  107  |  20  ----------------------------<  109<H>  4.4   |  20  |  1.3    Ca    8.2<L>      23 Apr 2019 01:35  Mg     2.7     04-23    TPro  5.7<L> [6.0 - 8.0]  /  Alb  4.6 [3.5 - 5.2]  /  TBili  0.6 [0.2 - 1.2]  /  DBili  x   /  AST  36 [0 - 41]  /  ALT  10 [0 - 41]  /  AlkPhos  30 [30 - 115]  04-23    PT/INR - ( 23 Apr 2019 01:35 )   PT: ;   INR: 1.22 ratio       PT/INR - ( 22 Apr 2019 14:11 )   PT: ;   INR: 1.20 ratio       PTT - ( 23 Apr 2019 01:35 )  PTT:31.8 sec, PTT - ( 22 Apr 2019 14:11 )  PTT:32.9 sec    ABG - ( 23 Apr 2019 04:13 )  pH: 7.35  /  pCO2: 42    /  pO2: 95    / HCO3: 23    / Base Excess: -2.4  /  SaO2: 98    /  LA: 0.8      Hemoglobin A1C with Mean Plasma Glucose (04.20.19 @ 07:31)    Hemoglobin A1C, Whole Blood: 5.8    RADIOLOGY & ADDITIONAL TESTS:  CXR: < from: Xray Chest 1 View- PORTABLE-Routine (04.23.19 @ 03:52) >  Impression:      Interval extubation and removal of enteric tube.    Stable small left sided effusion/opacity and right basilar subsegmental   atelectasis. No appreciable pneumothorax.    < end of copied text >    EKG: < from: 12 Lead ECG (04.23.19 @ 07:58) >  Ventricular Rate 76 BPM  Atrial Rate 76 BPM  P-R Interval 200 ms  QRS Duration 90 ms  Q-T Interval 368 ms  QTC Calculation(Bezet) 414 ms  P Axis 51 degrees  R Axis 26 degrees  T Axis 42 degrees  Diagnosis Line Normal sinus rhythm  Nonspecific T wave abnormality minor  Otherwise normal ECG  Confirmed by GIGI PIMENTEL MD ([id number]) on 4/23/2019 9:25:38 AM  < end of copied text >      Allergies  No Known Allergies  Intolerances      MEDICATIONS  (STANDING):  acetaminophen   Tablet .. 650 milliGRAM(s) Oral every 6 hours  ALBUTerol    90 MICROgram(s) HFA Inhaler 2 Puff(s) Inhalation every 6 hours  aspirin enteric coated 325 milliGRAM(s) Oral daily  ceFAZolin   IVPB 1000 milliGRAM(s) IV Intermittent every 8 hours  chlorhexidine 0.12% Liquid 5 milliLiter(s) Oral Mucosa two times a day  chlorhexidine 4% Liquid 1 Application(s) Topical <User Schedule>  dexmedetomidine Infusion 0.25 MICROgram(s)/kG/Hr (4.619 mL/Hr) IV Continuous <Continuous>  dextrose 50% Injectable 50 milliLiter(s) IV Push every 15 minutes  dextrose 50% Injectable 25 milliLiter(s) IV Push every 15 minutes  docusate sodium 100 milliGRAM(s) Oral three times a day  famotidine Injectable 20 milliGRAM(s) IV Push every 12 hours  guaiFENesin  milliGRAM(s) Oral every 12 hours  insulin Infusion 1 Unit(s)/Hr (1 mL/Hr) IV Continuous <Continuous>  ipratropium 17 MICROgram(s) HFA Inhaler 2 Puff(s) Inhalation every 6 hours  magnesium sulfate  IVPB 1 Gram(s) IV Intermittent every 12 hours  meperidine     Injectable 25 milliGRAM(s) IV Push once  niCARdipine Infusion 5 mG/Hr (25 mL/Hr) IV Continuous <Continuous>  nitroglycerin  Infusion 5 MICROgram(s)/Min (1.5 mL/Hr) IV Continuous <Continuous>  norepinephrine Infusion 0.05 MICROgram(s)/kG/Min (6.928 mL/Hr) IV Continuous <Continuous>  polyethylene glycol 3350 17 Gram(s) Oral daily  propofol Infusion 10 MICROgram(s)/kG/Min (4.434 mL/Hr) IV Continuous <Continuous>  sodium chloride 0.9%. 1000 milliLiter(s) (20 mL/Hr) IV Continuous <Continuous>    MEDICATIONS  (PRN):  ondansetron Injectable 4 milliGRAM(s) IV Push every 6 hours PRN Nausea and/or Vomiting  oxyCODONE    IR 10 milliGRAM(s) Oral every 4 hours PRN Severe Pain (7 - 10)  oxyCODONE    IR 5 milliGRAM(s) Oral every 4 hours PRN Moderate Pain (4 - 6)    Home Medications:  aspirin 81 mg oral tablet, chewable: 1 tab(s) orally once a day (19 Apr 2019 08:06)  irbesartan 150 mg oral tablet: 1 tab(s) orally once a day (19 Apr 2019 08:06)      Post-Op Beta-Blockers: [x] Yes, [] No: contraindication:  Post-Op Nitrate: [x] Yes, [] No: contraindication:  Post-Op Aspirin:  [x] Yes, [] No: contraindication:  Post-Op Statin:  [x] Yes, [] No: contraindication:    Ambulation/Activity Status: OOB/AMB    Assessment/Plan:  63y Male status-post CABG3 w/L radial               POD #    1  - Case and plan discussed with CTU Intensivist and CT Surgeon - Dr. Clemente   - Continue CTU supportive care and ongoing plan of care as per continuing CTU rounds.   - Continue DVT/GI prophylaxis  - Incentive Spirometry 10 times an hour  - Continue to advance physical activity as tolerated and continue PT/OT as directed  1. CAD: Continue ASA, start statin, BB and isordil   2. A. Fib ppx: magnesium sulfate  IVPB 1 Gram(s) IV Intermittent every 12 hours    3. COPD/Hypoxia: continue duoneb    4. DM/Glucose Control: d/c insulin gtt and start RISS.

## 2019-04-23 NOTE — PROGRESS NOTE ADULT - SUBJECTIVE AND OBJECTIVE BOX
Over Night Events:  POD#1 CABG Radila graft  extubated yestrday      ROS:  mostly inceional pains and headach      HPI:  64 yo M h/o HTN has been c/o chest pain had + stress test [place] here for elective cardiac cath (19 Apr 2019 09:38)    No Known Allergies    PHYSICAL EXAM    ICU Vital Signs Last 24 Hrs  T(C): 37.1 (23 Apr 2019 08:00), Max: 37.1 (23 Apr 2019 08:00)  T(F): 98.8 (23 Apr 2019 08:00), Max: 98.8 (23 Apr 2019 08:00)  HR: 80 (23 Apr 2019 09:00) (72 - 90)  BP: 131/68 (23 Apr 2019 07:30) (103/55 - 131/68)  BP(mean): 86 (23 Apr 2019 07:30) (67 - 86)  ABP: 130/56 (23 Apr 2019 09:00) (94/46 - 136/60)  ABP(mean): 80 (23 Apr 2019 09:00) (64 - 86)  RR: 25 (23 Apr 2019 09:00) (0 - 26)  SpO2: 96% (23 Apr 2019 09:00) (94% - 100%)    No Known Allergies    General:AO x3 swan and chest tubes in  HEENT:           Nl     Lymph Nodes: NO cervical LN   Lungs: Bilateral BS  Cardiovascular: Regular   Abdomen: Soft, Positive BS  Extremities: No clubbing   Skin: Nl  Neurological: Nl    04-22-19 @ 07:01 - 04-23-19 @ 07:00  --------------------------------------------------------  IN: 2825.6 mL / OUT: 2013 mL / NET: 812.6 mL    04-23-19 @ 07:01 - 04-23-19 @ 10:41  --------------------------------------------------------  IN: 33 mL / OUT: 97 mL / NET: -64 mL        LABS:                          8.7    10.84 )-----------( 134      ( 23 Apr 2019 01:35 )             26.0                                               04-23    142  |  110  |  24<H>  ----------------------------<  135<H>  4.5   |  21  |  1.3    Ca    8.2<L>      23 Apr 2019 01:35  Mg     2.7     04-23    TPro  5.7<L>  /  Alb  4.6  /  TBili  0.6  /  DBili  x   /  AST  36  /  ALT  10  /  AlkPhos  30  04-23      PT/INR - ( 23 Apr 2019 01:35 )   PT: 14.00 sec;   INR: 1.22 ratio         PTT - ( 23 Apr 2019 01:35 )  PTT:31.8 sec                                                                                     LIVER FUNCTIONS - ( 23 Apr 2019 01:35 )  Alb: 4.6 g/dL / Pro: 5.7 g/dL / ALK PHOS: 30 U/L / ALT: 10 U/L / AST: 36 U/L / GGT: x                                                                                               Mode: CPAP with PS  FiO2: 50  PEEP: 5  PS: 7                                      ABG - ( 23 Apr 2019 04:13 )  pH, Arterial: 7.35  pH, Blood: x     /  pCO2: 42    /  pO2: 95    / HCO3: 23    / Base Excess: -2.4  /  SaO2: 98                  MEDICATIONS  (STANDING):  acetaminophen   Tablet .. 650 milliGRAM(s) Oral every 6 hours  ALBUTerol    90 MICROgram(s) HFA Inhaler 2 Puff(s) Inhalation every 6 hours  aspirin enteric coated 325 milliGRAM(s) Oral daily  atorvastatin 40 milliGRAM(s) Oral at bedtime  ceFAZolin   IVPB 1000 milliGRAM(s) IV Intermittent every 8 hours  chlorhexidine 0.12% Liquid 5 milliLiter(s) Oral Mucosa two times a day  chlorhexidine 4% Liquid 1 Application(s) Topical <User Schedule>  dexmedetomidine Infusion 0.25 MICROgram(s)/kG/Hr (4.619 mL/Hr) IV Continuous <Continuous>  dextrose 5%. 1000 milliLiter(s) (50 mL/Hr) IV Continuous <Continuous>  dextrose 50% Injectable 50 milliLiter(s) IV Push every 15 minutes  dextrose 50% Injectable 25 milliLiter(s) IV Push every 15 minutes  docusate sodium 100 milliGRAM(s) Oral three times a day  famotidine Injectable 20 milliGRAM(s) IV Push every 12 hours  guaiFENesin  milliGRAM(s) Oral every 12 hours  insulin lispro (HumaLOG) corrective regimen sliding scale   SubCutaneous three times a day before meals  ipratropium 17 MICROgram(s) HFA Inhaler 2 Puff(s) Inhalation every 6 hours  isosorbide   dinitrate Tablet (ISORDIL) 5 milliGRAM(s) Oral three times a day  magnesium sulfate  IVPB 1 Gram(s) IV Intermittent every 12 hours  meperidine     Injectable 25 milliGRAM(s) IV Push once  metoprolol tartrate 12.5 milliGRAM(s) Oral two times a day  niCARdipine Infusion 5 mG/Hr (25 mL/Hr) IV Continuous <Continuous>  nitroglycerin  Infusion 5 MICROgram(s)/Min (1.5 mL/Hr) IV Continuous <Continuous>  norepinephrine Infusion 0.05 MICROgram(s)/kG/Min (6.928 mL/Hr) IV Continuous <Continuous>  polyethylene glycol 3350 17 Gram(s) Oral daily  propofol Infusion 10 MICROgram(s)/kG/Min (4.434 mL/Hr) IV Continuous <Continuous>  sodium chloride 0.9%. 1000 milliLiter(s) (20 mL/Hr) IV Continuous <Continuous>    MEDICATIONS  (PRN):  dextrose 40% Gel 15 Gram(s) Oral once PRN Blood Glucose LESS THAN 70 milliGRAM(s)/deciliter  glucagon  Injectable 1 milliGRAM(s) IntraMuscular once PRN Glucose LESS THAN 70 milligrams/deciliter  ondansetron Injectable 4 milliGRAM(s) IV Push every 6 hours PRN Nausea and/or Vomiting  oxyCODONE    IR 10 milliGRAM(s) Oral every 4 hours PRN Severe Pain (7 - 10)  oxyCODONE    IR 5 milliGRAM(s) Oral every 4 hours PRN Moderate Pain (4 - 6)      Xrays:                                                                                     ECHO    IMPRESSION:      < from: Xray Chest 1 View- PORTABLE-Routine (04.23.19 @ 03:52) >  Impression:      Interval extubation and removal of enteric tube.    Stable small left sided effusion/opacity and right basilar subsegmental   atelectasis. No appreciable pneumothorax.    < end of copied text >

## 2019-04-23 NOTE — PHYSICAL THERAPY INITIAL EVALUATION ADULT - GAIT DISTANCE, PT EVAL
80'x2 with standing rest breaks. Pt cotinued to c/o slight lightheadedness. However did not worsen with change in position or amb.

## 2019-04-23 NOTE — PHYSICAL THERAPY INITIAL EVALUATION ADULT - GENERAL OBSERVATIONS, REHAB EVAL
09:40-10:37 Pt encountered sitting in bed in chair mode with tele, IV, radial A-line, otero, 2 CTs to suction, PRITI to left graft site, 3L/m O2 NC, RN Carmen @ b/s, in NAD. Pt with c/o 4-5/10 pain on VAS @ CT sites & slight lightheadedness. RN present & aware. BP: 136/58, HR: 78bpm, SpO2 97% on O2. Pt agreeable to PT. Pt left sitting in b/s chair with tele, IV, radial A-line, otero, 2 CTs to suction, PRITI to left graft site, 3L/m O2 NC, RNs Carmen & Rae @ b/s, in NAD. BP: 116/54, HR: 80bpm, SpO2 97% on O2.

## 2019-04-23 NOTE — PHYSICAL THERAPY INITIAL EVALUATION ADULT - PERTINENT HX OF CURRENT PROBLEM, REHAB EVAL
64 yo M h/o HTN has been c/o chest pain had + stress test [place]pt [place] here for elective cardiac cath. Cardiac cath reveals significant left main and LAD disease. Pt now s/p CABGx3.

## 2019-04-23 NOTE — PROGRESS NOTE ADULT - ASSESSMENT
62 yo M h/o HTN has been c/o chest pain had + stress test [place] an [place]pt [place] here for elective cardiac cath (19 Apr 2019 09:38)  1- S/P CABG   Radial graft  pod# 1   2- essential HTN on NTG drip  3- anemai of acute blood loss  4- Thrombocytopenia    Plan  ambulate reevaluate chest tubes in afternoon  remove swan and a line     - start  asa BBlockers and Isordil 5 mg q8  - taper  and d/c nitroglycerin    ambulate

## 2019-04-24 ENCOUNTER — TRANSCRIPTION ENCOUNTER (OUTPATIENT)
Age: 64
End: 2019-04-24

## 2019-04-24 LAB
ALBUMIN SERPL ELPH-MCNC: 4.1 G/DL — SIGNIFICANT CHANGE UP (ref 3.5–5.2)
ALP SERPL-CCNC: 36 U/L — SIGNIFICANT CHANGE UP (ref 30–115)
ALT FLD-CCNC: 14 U/L — SIGNIFICANT CHANGE UP (ref 0–41)
ANION GAP SERPL CALC-SCNC: 9 MMOL/L — SIGNIFICANT CHANGE UP (ref 7–14)
APTT BLD: 36.7 SEC — SIGNIFICANT CHANGE UP (ref 27–39.2)
AST SERPL-CCNC: 34 U/L — SIGNIFICANT CHANGE UP (ref 0–41)
BASOPHILS # BLD AUTO: 0.02 K/UL — SIGNIFICANT CHANGE UP (ref 0–0.2)
BASOPHILS NFR BLD AUTO: 0.2 % — SIGNIFICANT CHANGE UP (ref 0–1)
BILIRUB SERPL-MCNC: 0.5 MG/DL — SIGNIFICANT CHANGE UP (ref 0.2–1.2)
BUN SERPL-MCNC: 21 MG/DL — HIGH (ref 10–20)
CALCIUM SERPL-MCNC: 8.4 MG/DL — LOW (ref 8.5–10.1)
CHLORIDE SERPL-SCNC: 106 MMOL/L — SIGNIFICANT CHANGE UP (ref 98–110)
CO2 SERPL-SCNC: 25 MMOL/L — SIGNIFICANT CHANGE UP (ref 17–32)
CREAT SERPL-MCNC: 1.3 MG/DL — SIGNIFICANT CHANGE UP (ref 0.7–1.5)
EOSINOPHIL # BLD AUTO: 0.06 K/UL — SIGNIFICANT CHANGE UP (ref 0–0.7)
EOSINOPHIL NFR BLD AUTO: 0.6 % — SIGNIFICANT CHANGE UP (ref 0–8)
GLUCOSE BLDC GLUCOMTR-MCNC: 109 MG/DL — HIGH (ref 70–99)
GLUCOSE BLDC GLUCOMTR-MCNC: 124 MG/DL — HIGH (ref 70–99)
GLUCOSE SERPL-MCNC: 125 MG/DL — HIGH (ref 70–99)
HCT VFR BLD CALC: 25.6 % — LOW (ref 42–52)
HGB BLD-MCNC: 8.5 G/DL — LOW (ref 14–18)
IMM GRANULOCYTES NFR BLD AUTO: 0.7 % — HIGH (ref 0.1–0.3)
INR BLD: 1.12 RATIO — SIGNIFICANT CHANGE UP (ref 0.65–1.3)
LYMPHOCYTES # BLD AUTO: 0.67 K/UL — LOW (ref 1.2–3.4)
LYMPHOCYTES # BLD AUTO: 6.3 % — LOW (ref 20.5–51.1)
MAGNESIUM SERPL-MCNC: 2.4 MG/DL — SIGNIFICANT CHANGE UP (ref 1.8–2.4)
MCHC RBC-ENTMCNC: 28.5 PG — SIGNIFICANT CHANGE UP (ref 27–31)
MCHC RBC-ENTMCNC: 33.2 G/DL — SIGNIFICANT CHANGE UP (ref 32–37)
MCV RBC AUTO: 85.9 FL — SIGNIFICANT CHANGE UP (ref 80–94)
MONOCYTES # BLD AUTO: 0.92 K/UL — HIGH (ref 0.1–0.6)
MONOCYTES NFR BLD AUTO: 8.6 % — SIGNIFICANT CHANGE UP (ref 1.7–9.3)
NEUTROPHILS # BLD AUTO: 8.92 K/UL — HIGH (ref 1.4–6.5)
NEUTROPHILS NFR BLD AUTO: 83.6 % — HIGH (ref 42.2–75.2)
NRBC # BLD: 0 /100 WBCS — SIGNIFICANT CHANGE UP (ref 0–0)
PLATELET # BLD AUTO: 132 K/UL — SIGNIFICANT CHANGE UP (ref 130–400)
POTASSIUM SERPL-MCNC: 3.9 MMOL/L — SIGNIFICANT CHANGE UP (ref 3.5–5)
POTASSIUM SERPL-SCNC: 3.9 MMOL/L — SIGNIFICANT CHANGE UP (ref 3.5–5)
PROT SERPL-MCNC: 5.5 G/DL — LOW (ref 6–8)
PROTHROM AB SERPL-ACNC: 12.9 SEC — HIGH (ref 9.95–12.87)
RBC # BLD: 2.98 M/UL — LOW (ref 4.7–6.1)
RBC # FLD: 13.2 % — SIGNIFICANT CHANGE UP (ref 11.5–14.5)
SODIUM SERPL-SCNC: 140 MMOL/L — SIGNIFICANT CHANGE UP (ref 135–146)
WBC # BLD: 10.66 K/UL — SIGNIFICANT CHANGE UP (ref 4.8–10.8)
WBC # FLD AUTO: 10.66 K/UL — SIGNIFICANT CHANGE UP (ref 4.8–10.8)

## 2019-04-24 PROCEDURE — 71045 X-RAY EXAM CHEST 1 VIEW: CPT | Mod: 26

## 2019-04-24 PROCEDURE — 93010 ELECTROCARDIOGRAM REPORT: CPT

## 2019-04-24 RX ORDER — POTASSIUM CHLORIDE 20 MEQ
40 PACKET (EA) ORAL ONCE
Qty: 0 | Refills: 0 | Status: COMPLETED | OUTPATIENT
Start: 2019-04-24 | End: 2019-04-24

## 2019-04-24 RX ORDER — METOPROLOL TARTRATE 50 MG
25 TABLET ORAL
Qty: 0 | Refills: 0 | Status: DISCONTINUED | OUTPATIENT
Start: 2019-04-24 | End: 2019-04-26

## 2019-04-24 RX ORDER — FUROSEMIDE 40 MG
40 TABLET ORAL ONCE
Qty: 0 | Refills: 0 | Status: COMPLETED | OUTPATIENT
Start: 2019-04-24 | End: 2019-04-24

## 2019-04-24 RX ORDER — POTASSIUM CHLORIDE 20 MEQ
20 PACKET (EA) ORAL ONCE
Qty: 0 | Refills: 0 | Status: COMPLETED | OUTPATIENT
Start: 2019-04-24 | End: 2019-04-24

## 2019-04-24 RX ORDER — SENNA PLUS 8.6 MG/1
2 TABLET ORAL ONCE
Qty: 0 | Refills: 0 | Status: COMPLETED | OUTPATIENT
Start: 2019-04-24 | End: 2019-04-24

## 2019-04-24 RX ADMIN — Medication 650 MILLIGRAM(S): at 06:00

## 2019-04-24 RX ADMIN — Medication 650 MILLIGRAM(S): at 00:30

## 2019-04-24 RX ADMIN — Medication 650 MILLIGRAM(S): at 05:30

## 2019-04-24 RX ADMIN — ATORVASTATIN CALCIUM 40 MILLIGRAM(S): 80 TABLET, FILM COATED ORAL at 22:24

## 2019-04-24 RX ADMIN — CHLORHEXIDINE GLUCONATE 1 APPLICATION(S): 213 SOLUTION TOPICAL at 05:51

## 2019-04-24 RX ADMIN — ISOSORBIDE DINITRATE 5 MILLIGRAM(S): 5 TABLET ORAL at 05:51

## 2019-04-24 RX ADMIN — Medication 650 MILLIGRAM(S): at 11:10

## 2019-04-24 RX ADMIN — Medication 325 MILLIGRAM(S): at 11:10

## 2019-04-24 RX ADMIN — Medication 100 MILLIGRAM(S): at 14:29

## 2019-04-24 RX ADMIN — Medication 100 MILLIGRAM(S): at 05:52

## 2019-04-24 RX ADMIN — Medication 40 MILLIGRAM(S): at 08:00

## 2019-04-24 RX ADMIN — Medication 650 MILLIGRAM(S): at 00:00

## 2019-04-24 RX ADMIN — Medication 650 MILLIGRAM(S): at 11:56

## 2019-04-24 RX ADMIN — Medication 600 MILLIGRAM(S): at 05:52

## 2019-04-24 RX ADMIN — Medication 650 MILLIGRAM(S): at 17:44

## 2019-04-24 RX ADMIN — POLYETHYLENE GLYCOL 3350 17 GRAM(S): 17 POWDER, FOR SOLUTION ORAL at 11:56

## 2019-04-24 RX ADMIN — Medication 25 MILLIGRAM(S): at 05:51

## 2019-04-24 RX ADMIN — HEPARIN SODIUM 5000 UNIT(S): 5000 INJECTION INTRAVENOUS; SUBCUTANEOUS at 14:32

## 2019-04-24 RX ADMIN — Medication 100 GRAM(S): at 17:45

## 2019-04-24 RX ADMIN — FAMOTIDINE 20 MILLIGRAM(S): 10 INJECTION INTRAVENOUS at 17:44

## 2019-04-24 RX ADMIN — Medication 100 MILLIEQUIVALENT(S): at 06:21

## 2019-04-24 RX ADMIN — ISOSORBIDE DINITRATE 5 MILLIGRAM(S): 5 TABLET ORAL at 22:24

## 2019-04-24 RX ADMIN — Medication 25 MILLIGRAM(S): at 17:44

## 2019-04-24 RX ADMIN — ISOSORBIDE DINITRATE 5 MILLIGRAM(S): 5 TABLET ORAL at 14:29

## 2019-04-24 RX ADMIN — Medication 40 MILLIEQUIVALENT(S): at 09:18

## 2019-04-24 RX ADMIN — Medication 100 GRAM(S): at 05:51

## 2019-04-24 RX ADMIN — Medication 3 MILLILITER(S): at 21:29

## 2019-04-24 RX ADMIN — HEPARIN SODIUM 5000 UNIT(S): 5000 INJECTION INTRAVENOUS; SUBCUTANEOUS at 05:52

## 2019-04-24 RX ADMIN — SENNA PLUS 2 TABLET(S): 8.6 TABLET ORAL at 11:10

## 2019-04-24 RX ADMIN — Medication 100 MILLIGRAM(S): at 22:24

## 2019-04-24 RX ADMIN — HEPARIN SODIUM 5000 UNIT(S): 5000 INJECTION INTRAVENOUS; SUBCUTANEOUS at 22:24

## 2019-04-24 RX ADMIN — Medication 600 MILLIGRAM(S): at 17:44

## 2019-04-24 RX ADMIN — Medication 650 MILLIGRAM(S): at 23:10

## 2019-04-24 RX ADMIN — FAMOTIDINE 20 MILLIGRAM(S): 10 INJECTION INTRAVENOUS at 05:51

## 2019-04-24 NOTE — DISCHARGE NOTE NURSING/CASE MANAGEMENT/SOCIAL WORK - NSDCPNDISPN_GEN_ALL_CORE
Safe use, storage and disposal of opioids when prescribed/Education provided on the pain management plan of care/Activities of daily living, including home environment that might     exacerbate pain or reduce effectiveness of the pain management plan of care as well as strategies to address these issues

## 2019-04-24 NOTE — DISCHARGE NOTE NURSING/CASE MANAGEMENT/SOCIAL WORK - NSDCDPATPORTLINK_GEN_ALL_CORE
You can access the [facility] Patient Portal, offered by [facility], by registering with the following website: [email or web address]

## 2019-04-24 NOTE — PROGRESS NOTE ADULT - SUBJECTIVE AND OBJECTIVE BOX
OPERATIVE PROCEDURE(s):                POD #                       63yMale  SURGEON(s): THERESE Clemente  SUBJECTIVE ASSESSMENT:   Vital Signs Last 24 Hrs  T(F): 99 (24 Apr 2019 04:00), Max: 99.3 (23 Apr 2019 12:00)  HR: 74 (24 Apr 2019 07:00) (72 - 82)  BP: 121/62 (24 Apr 2019 06:00) (110/56 - 133/65)  BP(mean): 84 (24 Apr 2019 06:00) (71 - 86)  ABP: 120/54 (23 Apr 2019 17:00) (112/52 - 136/54)  ABP(mean): 74 (23 Apr 2019 17:00)  RR: 20 (24 Apr 2019 07:00) (15 - 25)  SpO2: 96% (24 Apr 2019 07:00) (93% - 100%)  CVP(mm Hg): 8 (23 Apr 2019 08:30)  CVP(cm H2O): --  CO: 7.3 (23 Apr 2019 08:00)  CI: 3.7 (23 Apr 2019 08:00)  PA: 27/5 (23 Apr 2019 08:45)  SVR: 788 (23 Apr 2019 08:00)    I&O's Detail    23 Apr 2019 07:01  -  24 Apr 2019 07:00  --------------------------------------------------------  IN:    insulin Infusion: 2 mL    IV PiggyBack: 350 mL    nitroglycerin  Infusion: 22 mL    Oral Fluid: 1040 mL    sodium chloride 0.9%.: 40 mL  Total IN: 1454 mL    OUT:    Bulb: 10 mL    Chest Tube: 150 mL    Chest Tube: 60 mL    Ureteral Catheter: 614 mL    Voided: 1370 mL  Total OUT: 2204 mL        Net:   I&O's Detail    22 Apr 2019 07:01  -  23 Apr 2019 07:00  --------------------------------------------------------  Total NET: 812.6 mL      23 Apr 2019 07:01  -  24 Apr 2019 07:00  --------------------------------------------------------  Total NET: -750 mL        CAPILLARY BLOOD GLUCOSE  141 (23 Apr 2019 16:00)  127 (23 Apr 2019 12:00)  117 (23 Apr 2019 09:00)      POCT Blood Glucose.: 124 mg/dL (24 Apr 2019 06:00)  POCT Blood Glucose.: 121 mg/dL (23 Apr 2019 21:20)  POCT Blood Glucose.: 142 mg/dL (23 Apr 2019 16:40)  POCT Blood Glucose.: 127 mg/dL (23 Apr 2019 10:53)  POCT Blood Glucose.: 117 mg/dL (23 Apr 2019 09:10)    Physical Exam:  General: NAD; A&Ox3/Patient is intubated and sedated  Cardiac: S1/S2, RRR, no murmur, no rubs  Lungs: unlabored respirations, CTA b/l, no wheeze, no rales, no crackles  Abdomen: Soft/NT/ND; positive bowel sounds x 4  Sternum: Intact, no click, incision healing well with no drainage  Incisions: Incisions clean/dry/intact  Extremities: No edema b/l lower extremities; good capillary refill; no cyanosis; palpable 1+ pedal pulses b/l    Central Venous Catheter: Yes[]  No[] , If Yes indication:           Day #  Hollis Catheter: Yes  [] , No  [] , If yes indication:                      Day #  NGT: Yes [] No [] ,    If Yes Placement:                                     Day #  EPICARDIAL WIRES:  [] YES [] NO                                              Day #  BOWEL MOVEMENT:  [] YES [] NO, If No, Timing since last BM:      Day #  CHEST TUBE(Left/Right):  [] YES [] NO, If yes -  AIR LEAKS:  [] YES [] NO        LABS:                        8.5<L>  10.66 )-----------( 132      ( 24 Apr 2019 01:30 )             25.6<L>                        8.7<L>  10.84<H> )-----------( 134      ( 23 Apr 2019 01:35 )             26.0<L>    04-24    140  |  106  |  21<H>  ----------------------------<  125<H>  3.9   |  25  |  1.3  04-23    142  |  110  |  24<H>  ----------------------------<  135<H>  4.5   |  21  |  1.3    Ca    8.4<L>      24 Apr 2019 01:30  Mg     2.4     04-24    TPro  5.5<L> [6.0 - 8.0]  /  Alb  4.1 [3.5 - 5.2]  /  TBili  0.5 [0.2 - 1.2]  /  DBili  x   /  AST  34 [0 - 41]  /  ALT  14 [0 - 41]  /  AlkPhos  36 [30 - 115]  04-24    PT/INR - ( 24 Apr 2019 01:30 )   PT: ;   INR: 1.12 ratio         PT/INR - ( 23 Apr 2019 01:35 )   PT: ;   INR: 1.22 ratio         PTT - ( 24 Apr 2019 01:30 )  PTT:36.7 sec, PTT - ( 23 Apr 2019 01:35 )  PTT:31.8 sec    ABG - ( 23 Apr 2019 04:13 )  pH: 7.35  /  pCO2: 42    /  pO2: 95    / HCO3: 23    / Base Excess: -2.4  /  SaO2: 98    /  LA: 0.8              RADIOLOGY & ADDITIONAL TESTS:  CXR:  EKG:  MEDICATIONS  (STANDING):  acetaminophen   Tablet .. 650 milliGRAM(s) Oral every 6 hours  ALBUTerol/ipratropium for Nebulization 3 milliLiter(s) Nebulizer every 6 hours  aspirin enteric coated 325 milliGRAM(s) Oral daily  atorvastatin 40 milliGRAM(s) Oral at bedtime  chlorhexidine 4% Liquid 1 Application(s) Topical <User Schedule>  dextrose 5%. 1000 milliLiter(s) (50 mL/Hr) IV Continuous <Continuous>  dextrose 50% Injectable 50 milliLiter(s) IV Push every 15 minutes  dextrose 50% Injectable 25 milliLiter(s) IV Push every 15 minutes  docusate sodium 100 milliGRAM(s) Oral three times a day  famotidine    Tablet 20 milliGRAM(s) Oral two times a day  guaiFENesin  milliGRAM(s) Oral every 12 hours  heparin  Injectable 5000 Unit(s) SubCutaneous every 8 hours  insulin lispro (HumaLOG) corrective regimen sliding scale   SubCutaneous three times a day before meals  isosorbide   dinitrate Tablet (ISORDIL) 5 milliGRAM(s) Oral three times a day  magnesium sulfate  IVPB 1 Gram(s) IV Intermittent every 12 hours  metoprolol tartrate 25 milliGRAM(s) Oral two times a day  polyethylene glycol 3350 17 Gram(s) Oral daily  sodium chloride 0.9%. 1000 milliLiter(s) (20 mL/Hr) IV Continuous <Continuous>    MEDICATIONS  (PRN):  dextrose 40% Gel 15 Gram(s) Oral once PRN Blood Glucose LESS THAN 70 milliGRAM(s)/deciliter  glucagon  Injectable 1 milliGRAM(s) IntraMuscular once PRN Glucose LESS THAN 70 milligrams/deciliter  ondansetron Injectable 4 milliGRAM(s) IV Push every 6 hours PRN Nausea and/or Vomiting  oxyCODONE    IR 10 milliGRAM(s) Oral every 4 hours PRN Severe Pain (7 - 10)  oxyCODONE    IR 5 milliGRAM(s) Oral every 4 hours PRN Moderate Pain (4 - 6)    HEPARIN:  [] YES [] NO  Dose: XX UNITS/HR UNITS Q8H  LOVENOX:[] YES [] NO  Dose: XX mg Q24H  COUMADIN: []  YES [] NO  Dose: XX mg  Q24H  SCD's: YES b/l  GI Prophylaxis: Protonix [], Pepcid [], None [], (Contra-indication:.....)    Post-Op Beta-Blockers: Yes [], No[], If No, then contraindication:  Post-Op Aspirin: Yes [],  No [], If No, then contraindication:  Post-Op Statin: Yes [], No[], If No, then contraindication:  Allergies    No Known Allergies    Intolerances      Ambulation/Activity Status:    Assessment/Plan:  63y Male status-post .....  - Case and plan discussed with CTU Intensivist and CT Surgeon - Dr. Giron/Autumn/Chyna   - Continue CTU supportive care    - Continue DVT/GI prophylaxis  - Incentive Spirometry 10 times an hour  - Continue to advance physical activity as tolerated and continue PT/OT as directed  1. CAD: Continue ASA, statin, BB  2. HTN:   3. A. Fib:   4. COPD/Hypoxia:   5. DM/Glucose Control:     Social Service Disposition: OPERATIVE PROCEDURE(s):    cabgx 3 with left radial harvest            POD #    2                   63yMale  SURGEON(s): THERESE Clemente  SUBJECTIVE ASSESSMENT: pt seen and examined. no acute complaints at this time  Vital Signs Last 24 Hrs  T(F): 99 (24 Apr 2019 04:00), Max: 99.3 (23 Apr 2019 12:00)  HR: 74 (24 Apr 2019 07:00) (72 - 82)  BP: 121/62 (24 Apr 2019 06:00) (110/56 - 133/65)  BP(mean): 84 (24 Apr 2019 06:00) (71 - 86)  ABP: 120/54 (23 Apr 2019 17:00) (112/52 - 136/54)  ABP(mean): 74 (23 Apr 2019 17:00)  RR: 20 (24 Apr 2019 07:00) (15 - 25)  SpO2: 96% (24 Apr 2019 07:00) (93% - 100%)  CVP(mm Hg): 8 (23 Apr 2019 08:30)  CO: 7.3 (23 Apr 2019 08:00)  CI: 3.7 (23 Apr 2019 08:00)  PA: 27/5 (23 Apr 2019 08:45)  SVR: 788 (23 Apr 2019 08:00)    I&O's Detail    23 Apr 2019 07:01  -  24 Apr 2019 07:00  --------------------------------------------------------  IN:    insulin Infusion: 2 mL    IV PiggyBack: 350 mL    nitroglycerin  Infusion: 22 mL    Oral Fluid: 1040 mL    sodium chloride 0.9%.: 40 mL  Total IN: 1454 mL    OUT:    Bulb: 10 mL    Chest Tube: 150 mL    Chest Tube: 60 mL    Ureteral Catheter: 614 mL    Voided: 1370 mL  Total OUT: 2204 mL        Net:   I&O's Detail    22 Apr 2019 07:01  -  23 Apr 2019 07:00  --------------------------------------------------------  Total NET: 812.6 mL      23 Apr 2019 07:01  -  24 Apr 2019 07:00  --------------------------------------------------------  Total NET: -750 mL        CAPILLARY BLOOD GLUCOSE  141 (23 Apr 2019 16:00)  127 (23 Apr 2019 12:00)  117 (23 Apr 2019 09:00)      POCT Blood Glucose.: 124 mg/dL (24 Apr 2019 06:00)  POCT Blood Glucose.: 121 mg/dL (23 Apr 2019 21:20)  POCT Blood Glucose.: 142 mg/dL (23 Apr 2019 16:40)  POCT Blood Glucose.: 127 mg/dL (23 Apr 2019 10:53)  POCT Blood Glucose.: 117 mg/dL (23 Apr 2019 09:10)    Physical Exam:  General: NAD; A&Ox3  Cardiac: S1/S2, RRR, no murmur, no rubs  Lungs: unlabored respirations, CTA b/l, no wheeze, no rales, no crackles  Abdomen: Soft/NT/ND; positive bowel sounds x 4  Sternum: Intact, no click, incision healing well with no drainage  Incisions: Incisions clean/dry/intact  Extremities: No edema b/l lower extremities; good capillary refill; no cyanosis; palpable 1+ pedal pulses b/l    Central Venous Catheter: Yes[]  No[] , If Yes indication:           Day #  Hollis Catheter: Yes  [] , No  [] , If yes indication:                      Day #  NGT: Yes [] No [] ,    If Yes Placement:                                     Day #  EPICARDIAL WIRES:  [] YES [] NO                                              Day #  BOWEL MOVEMENT:  [] YES [] NO, If No, Timing since last BM:      Day #  CHEST TUBE(Left/Right):  [] YES [] NO, If yes -  AIR LEAKS:  [] YES [] NO        LABS:                        8.5<L>  10.66 )-----------( 132      ( 24 Apr 2019 01:30 )             25.6<L>                        8.7<L>  10.84<H> )-----------( 134      ( 23 Apr 2019 01:35 )             26.0<L>    04-24    140  |  106  |  21<H>  ----------------------------<  125<H>  3.9   |  25  |  1.3  04-23    142  |  110  |  24<H>  ----------------------------<  135<H>  4.5   |  21  |  1.3    Ca    8.4<L>      24 Apr 2019 01:30  Mg     2.4     04-24    TPro  5.5<L> [6.0 - 8.0]  /  Alb  4.1 [3.5 - 5.2]  /  TBili  0.5 [0.2 - 1.2]  /  DBili  x   /  AST  34 [0 - 41]  /  ALT  14 [0 - 41]  /  AlkPhos  36 [30 - 115]  04-24    PT/INR - ( 24 Apr 2019 01:30 )   PT: ;   INR: 1.12 ratio         PT/INR - ( 23 Apr 2019 01:35 )   PT: ;   INR: 1.22 ratio         PTT - ( 24 Apr 2019 01:30 )  PTT:36.7 sec, PTT - ( 23 Apr 2019 01:35 )  PTT:31.8 sec    ABG - ( 23 Apr 2019 04:13 )  pH: 7.35  /  pCO2: 42    /  pO2: 95    / HCO3: 23    / Base Excess: -2.4  /  SaO2: 98    /  LA: 0.8              RADIOLOGY & ADDITIONAL TESTS:  CXR: < from: Xray Chest 1 View- PORTABLE-Routine (04.24.19 @ 04:15) >  Increased left basilar opacity/blunted costophrenic angle.    < end of copied text >    EKG:  MEDICATIONS  (STANDING):  acetaminophen   Tablet .. 650 milliGRAM(s) Oral every 6 hours  ALBUTerol/ipratropium for Nebulization 3 milliLiter(s) Nebulizer every 6 hours  aspirin enteric coated 325 milliGRAM(s) Oral daily  atorvastatin 40 milliGRAM(s) Oral at bedtime  chlorhexidine 4% Liquid 1 Application(s) Topical <User Schedule>  dextrose 5%. 1000 milliLiter(s) (50 mL/Hr) IV Continuous <Continuous>  dextrose 50% Injectable 50 milliLiter(s) IV Push every 15 minutes  dextrose 50% Injectable 25 milliLiter(s) IV Push every 15 minutes  docusate sodium 100 milliGRAM(s) Oral three times a day  famotidine    Tablet 20 milliGRAM(s) Oral two times a day  guaiFENesin  milliGRAM(s) Oral every 12 hours  heparin  Injectable 5000 Unit(s) SubCutaneous every 8 hours  insulin lispro (HumaLOG) corrective regimen sliding scale   SubCutaneous three times a day before meals  isosorbide   dinitrate Tablet (ISORDIL) 5 milliGRAM(s) Oral three times a day  magnesium sulfate  IVPB 1 Gram(s) IV Intermittent every 12 hours  metoprolol tartrate 25 milliGRAM(s) Oral two times a day  polyethylene glycol 3350 17 Gram(s) Oral daily  sodium chloride 0.9%. 1000 milliLiter(s) (20 mL/Hr) IV Continuous <Continuous>    MEDICATIONS  (PRN):  dextrose 40% Gel 15 Gram(s) Oral once PRN Blood Glucose LESS THAN 70 milliGRAM(s)/deciliter  glucagon  Injectable 1 milliGRAM(s) IntraMuscular once PRN Glucose LESS THAN 70 milligrams/deciliter  ondansetron Injectable 4 milliGRAM(s) IV Push every 6 hours PRN Nausea and/or Vomiting  oxyCODONE    IR 10 milliGRAM(s) Oral every 4 hours PRN Severe Pain (7 - 10)  oxyCODONE    IR 5 milliGRAM(s) Oral every 4 hours PRN Moderate Pain (4 - 6)    HEPARIN:  [x] YES [] NO  Dose: XX UNITS/HR UNITS Q8H  LOVENOX:[] YES [x] NO  Dose: XX mg Q24H  COUMADIN: []  YES [x] NO  Dose: XX mg  Q24H  SCD's: YES b/l  GI Prophylaxis: Protonix [], Pepcid [x], None [], (Contra-indication:.....)    Post-Op Beta-Blockers: Yes [x], No[], If No, then contraindication:  Post-Op Aspirin: Yes [x],  No [], If No, then contraindication:  Post-Op Statin: Yes [x], No[], If No, then contraindication:  Allergies    No Known Allergies    Intolerances      Ambulation/Activity Status:    Assessment/Plan:  63y Male status-post .....  - Case and plan discussed with CTU Intensivist and CT Surgeon - Dr. Giron/Autumn/Chyna   - Continue CTU supportive care    - Continue DVT/GI prophylaxis  - Incentive Spirometry 10 times an hour  - Continue to advance physical activity as tolerated and continue PT/OT as directed  1. CAD: Continue ASA, statin, BB  2. HTN:   3. A. Fib:   4. COPD/Hypoxia:   5. DM/Glucose Control:     Social Service Disposition: OPERATIVE PROCEDURE(s):    cabgx 3 with left radial harvest            POD #    2                   63yMale  SURGEON(s): THERESE Clemente  SUBJECTIVE ASSESSMENT: pt seen and examined. no acute complaints at this time  Vital Signs Last 24 Hrs  T(F): 99 (24 Apr 2019 04:00), Max: 99.3 (23 Apr 2019 12:00)  HR: 74 (24 Apr 2019 07:00) (72 - 82)  BP: 121/62 (24 Apr 2019 06:00) (110/56 - 133/65)  BP(mean): 84 (24 Apr 2019 06:00) (71 - 86)  ABP: 120/54 (23 Apr 2019 17:00) (112/52 - 136/54)  ABP(mean): 74 (23 Apr 2019 17:00)  RR: 20 (24 Apr 2019 07:00) (15 - 25)  SpO2: 96% (24 Apr 2019 07:00) (93% - 100%)  CVP(mm Hg): 8 (23 Apr 2019 08:30)  CO: 7.3 (23 Apr 2019 08:00)  CI: 3.7 (23 Apr 2019 08:00)  PA: 27/5 (23 Apr 2019 08:45)  SVR: 788 (23 Apr 2019 08:00)    I&O's Detail    23 Apr 2019 07:01  -  24 Apr 2019 07:00  --------------------------------------------------------  IN:    insulin Infusion: 2 mL    IV PiggyBack: 350 mL    nitroglycerin  Infusion: 22 mL    Oral Fluid: 1040 mL    sodium chloride 0.9%.: 40 mL  Total IN: 1454 mL    OUT:    Bulb: 10 mL    Chest Tube: 150 mL    Chest Tube: 60 mL    Ureteral Catheter: 614 mL    Voided: 1370 mL  Total OUT: 2204 mL        Net:   I&O's Detail    22 Apr 2019 07:01  -  23 Apr 2019 07:00  --------------------------------------------------------  Total NET: 812.6 mL      23 Apr 2019 07:01  -  24 Apr 2019 07:00  --------------------------------------------------------  Total NET: -750 mL        CAPILLARY BLOOD GLUCOSE  141 (23 Apr 2019 16:00)  127 (23 Apr 2019 12:00)  117 (23 Apr 2019 09:00)      POCT Blood Glucose.: 124 mg/dL (24 Apr 2019 06:00)  POCT Blood Glucose.: 121 mg/dL (23 Apr 2019 21:20)  POCT Blood Glucose.: 142 mg/dL (23 Apr 2019 16:40)  POCT Blood Glucose.: 127 mg/dL (23 Apr 2019 10:53)  POCT Blood Glucose.: 117 mg/dL (23 Apr 2019 09:10)    Physical Exam:  General: NAD; A&Ox3  Cardiac: S1/S2, RRR, no murmur, no rubs  Lungs: decreased bs at bases, left>right  Abdomen: Soft/NT/ND; positive bowel sounds x 4  Sternum: Intact, no click, incision healing well with no drainage  Incisions: Incisions clean/dry/intact  Extremities: No edema b/l lower extremities; good capillary refill; no cyanosis; palpable 1+ pedal pulses b/l    Central Venous Catheter: Yes[x]  No[] , If Yes indication:      access     Day #2  Hollis Catheter: Yes  [] , No  [x] , If yes indication:                      Day #  NGT: Yes [] No [x] ,    If Yes Placement:                                     Day #  EPICARDIAL WIRES:  [x] YES [] NO                                              Day #2  BOWEL MOVEMENT:  [] YES [x] NO, If No, Timing since last BM:      Day #  CHEST TUBE(Left/Right):  [] YES [x] NO, If yes -  AIR LEAKS:  [] YES [] NO        LABS:                        8.5<L>  10.66 )-----------( 132      ( 24 Apr 2019 01:30 )             25.6<L>                        8.7<L>  10.84<H> )-----------( 134      ( 23 Apr 2019 01:35 )             26.0<L>    04-24    140  |  106  |  21<H>  ----------------------------<  125<H>  3.9   |  25  |  1.3  04-23    142  |  110  |  24<H>  ----------------------------<  135<H>  4.5   |  21  |  1.3    Ca    8.4<L>      24 Apr 2019 01:30  Mg     2.4     04-24    TPro  5.5<L> [6.0 - 8.0]  /  Alb  4.1 [3.5 - 5.2]  /  TBili  0.5 [0.2 - 1.2]  /  DBili  x   /  AST  34 [0 - 41]  /  ALT  14 [0 - 41]  /  AlkPhos  36 [30 - 115]  04-24    PT/INR - ( 24 Apr 2019 01:30 )   PT: ;   INR: 1.12 ratio         PT/INR - ( 23 Apr 2019 01:35 )   PT: ;   INR: 1.22 ratio         PTT - ( 24 Apr 2019 01:30 )  PTT:36.7 sec, PTT - ( 23 Apr 2019 01:35 )  PTT:31.8 sec    ABG - ( 23 Apr 2019 04:13 )  pH: 7.35  /  pCO2: 42    /  pO2: 95    / HCO3: 23    / Base Excess: -2.4  /  SaO2: 98    /  LA: 0.8              RADIOLOGY & ADDITIONAL TESTS:  CXR: < from: Xray Chest 1 View- PORTABLE-Routine (04.24.19 @ 04:15) >  Increased left basilar opacity/blunted costophrenic angle.    < end of copied text >    EKG:  MEDICATIONS  (STANDING):  acetaminophen   Tablet .. 650 milliGRAM(s) Oral every 6 hours  ALBUTerol/ipratropium for Nebulization 3 milliLiter(s) Nebulizer every 6 hours  aspirin enteric coated 325 milliGRAM(s) Oral daily  atorvastatin 40 milliGRAM(s) Oral at bedtime  chlorhexidine 4% Liquid 1 Application(s) Topical <User Schedule>  dextrose 5%. 1000 milliLiter(s) (50 mL/Hr) IV Continuous <Continuous>  dextrose 50% Injectable 50 milliLiter(s) IV Push every 15 minutes  dextrose 50% Injectable 25 milliLiter(s) IV Push every 15 minutes  docusate sodium 100 milliGRAM(s) Oral three times a day  famotidine    Tablet 20 milliGRAM(s) Oral two times a day  guaiFENesin  milliGRAM(s) Oral every 12 hours  heparin  Injectable 5000 Unit(s) SubCutaneous every 8 hours  insulin lispro (HumaLOG) corrective regimen sliding scale   SubCutaneous three times a day before meals  isosorbide   dinitrate Tablet (ISORDIL) 5 milliGRAM(s) Oral three times a day  magnesium sulfate  IVPB 1 Gram(s) IV Intermittent every 12 hours  metoprolol tartrate 25 milliGRAM(s) Oral two times a day  polyethylene glycol 3350 17 Gram(s) Oral daily  sodium chloride 0.9%. 1000 milliLiter(s) (20 mL/Hr) IV Continuous <Continuous>    MEDICATIONS  (PRN):  dextrose 40% Gel 15 Gram(s) Oral once PRN Blood Glucose LESS THAN 70 milliGRAM(s)/deciliter  glucagon  Injectable 1 milliGRAM(s) IntraMuscular once PRN Glucose LESS THAN 70 milligrams/deciliter  ondansetron Injectable 4 milliGRAM(s) IV Push every 6 hours PRN Nausea and/or Vomiting  oxyCODONE    IR 10 milliGRAM(s) Oral every 4 hours PRN Severe Pain (7 - 10)  oxyCODONE    IR 5 milliGRAM(s) Oral every 4 hours PRN Moderate Pain (4 - 6)    HEPARIN:  [x] YES [] NO  Dose: XX UNITS/HR UNITS Q8H  LOVENOX:[] YES [x] NO  Dose: XX mg Q24H  COUMADIN: []  YES [x] NO  Dose: XX mg  Q24H  SCD's: YES b/l  GI Prophylaxis: Protonix [], Pepcid [x], None [], (Contra-indication:.....)    Post-Op Beta-Blockers: Yes [x], No[], If No, then contraindication:  Post-Op Aspirin: Yes [x],  No [], If No, then contraindication:  Post-Op Statin: Yes [x], No[], If No, then contraindication:  Allergies    No Known Allergies    Intolerances      Ambulation/Activity Status: ambulate     Assessment/Plan:  63y Male status-post CABGx3 with left radial harvest pod#2  - Case and plan discussed with CTU Intensivist and CT Surgeon - Dr. Giron/Autumn/Chyna   - Continue CTU supportive care    - Continue DVT/GI prophylaxis  - Incentive Spirometry 10 times an hour  - Continue to advance physical activity as tolerated and continue PT/OT as directed  1. CAD: Continue ASA, statin, BB, cont isordil to prevent radial vasospasm  2. HTN: cont isordil and lopressor  3. A. Fib prophylaxis: mag 1gm bid, lopressor   4. COPD/Hypoxia: wean o2 as tolerated, lasix for fluid overload, nebs, mucinex  5. DM/Glucose Control: not a diabetic, fs in good range- d/c insulin sliding scale    Social Service Disposition:  home OPERATIVE PROCEDURE(s):    cabgx 3 with left radial harvest            POD #    2                   63yMale  SURGEON(s): THERESE Clemente    SUBJECTIVE ASSESSMENT: pt seen and examined. no acute complaints at this time    Vital Signs Last 24 Hrs  T(F): 99 (24 Apr 2019 04:00), Max: 99.3 (23 Apr 2019 12:00)  HR: 74 (24 Apr 2019 07:00) (72 - 82)  BP: 121/62 (24 Apr 2019 06:00) (110/56 - 133/65)  BP(mean): 84 (24 Apr 2019 06:00) (71 - 86)  ABP: 120/54 (23 Apr 2019 17:00) (112/52 - 136/54)  ABP(mean): 74 (23 Apr 2019 17:00)  RR: 20 (24 Apr 2019 07:00) (15 - 25)  SpO2: 96% (24 Apr 2019 07:00) (93% - 100%)  CVP(mm Hg): 8 (23 Apr 2019 08:30)  CO: 7.3 (23 Apr 2019 08:00)  CI: 3.7 (23 Apr 2019 08:00)  PA: 27/5 (23 Apr 2019 08:45)  SVR: 788 (23 Apr 2019 08:00)    I&O's Detail    23 Apr 2019 07:01  -  24 Apr 2019 07:00  --------------------------------------------------------  IN:    insulin Infusion: 2 mL    IV PiggyBack: 350 mL    nitroglycerin  Infusion: 22 mL    Oral Fluid: 1040 mL    sodium chloride 0.9%.: 40 mL  Total IN: 1454 mL    OUT:    Bulb: 10 mL    Chest Tube: 150 mL    Chest Tube: 60 mL    Ureteral Catheter: 614 mL    Voided: 1370 mL  Total OUT: 2204 mL    Net:   I&O's Detail    22 Apr 2019 07:01  -  23 Apr 2019 07:00  --------------------------------------------------------  Total NET: 812.6 mL      23 Apr 2019 07:01  -  24 Apr 2019 07:00  --------------------------------------------------------  Total NET: -750 mL    CAPILLARY BLOOD GLUCOSE  141 (23 Apr 2019 16:00)  127 (23 Apr 2019 12:00)  117 (23 Apr 2019 09:00)    POCT Blood Glucose.: 124 mg/dL (24 Apr 2019 06:00)  POCT Blood Glucose.: 121 mg/dL (23 Apr 2019 21:20)  POCT Blood Glucose.: 142 mg/dL (23 Apr 2019 16:40)  POCT Blood Glucose.: 127 mg/dL (23 Apr 2019 10:53)  POCT Blood Glucose.: 117 mg/dL (23 Apr 2019 09:10)    Physical Exam:  General: NAD; A&Ox3  Cardiac: S1/S2, RRR, no murmur, no rubs  Lungs: decreased bs at bases, left>right  Abdomen: Soft/NT/ND; positive bowel sounds x 4  Sternum: Intact, no click, incision healing well with no drainage  Incisions: Incisions clean/dry/intact  Extremities: No edema b/l lower extremities; good capillary refill; no cyanosis; palpable 1+ pedal pulses b/l    Central Venous Catheter: Yes[x]  No[] , If Yes indication:      access     Day #2  Hollis Catheter: Yes  [] , No  [x] , If yes indication:                      Day #  NGT: Yes [] No [x] ,    If Yes Placement:                                     Day #  EPICARDIAL WIRES:  [x] YES [] NO                                              Day #2  BOWEL MOVEMENT:  [] YES [x] NO, If No, Timing since last BM:      Day #  CHEST TUBE(Left/Right):  [] YES [x] NO, If yes -  AIR LEAKS:  [] YES [] NO        LABS:                        8.5<L>  10.66 )-----------( 132      ( 24 Apr 2019 01:30 )             25.6<L>                        8.7<L>  10.84<H> )-----------( 134      ( 23 Apr 2019 01:35 )             26.0<L>    04-24    140  |  106  |  21<H>  ----------------------------<  125<H>  3.9   |  25  |  1.3  04-23    142  |  110  |  24<H>  ----------------------------<  135<H>  4.5   |  21  |  1.3    Ca    8.4<L>      24 Apr 2019 01:30  Mg     2.4     04-24    TPro  5.5<L> [6.0 - 8.0]  /  Alb  4.1 [3.5 - 5.2]  /  TBili  0.5 [0.2 - 1.2]  /  DBili  x   /  AST  34 [0 - 41]  /  ALT  14 [0 - 41]  /  AlkPhos  36 [30 - 115]  04-24    PT/INR - ( 24 Apr 2019 01:30 )   PT: ;   INR: 1.12 ratio         PT/INR - ( 23 Apr 2019 01:35 )   PT: ;   INR: 1.22 ratio         PTT - ( 24 Apr 2019 01:30 )  PTT:36.7 sec, PTT - ( 23 Apr 2019 01:35 )  PTT:31.8 sec    ABG - ( 23 Apr 2019 04:13 )  pH: 7.35  /  pCO2: 42    /  pO2: 95    / HCO3: 23    / Base Excess: -2.4  /  SaO2: 98    /  LA: 0.8      RADIOLOGY & ADDITIONAL TESTS:  CXR: < from: Xray Chest 1 View- PORTABLE-Routine (04.24.19 @ 04:15) >  Increased left basilar opacity/blunted costophrenic angle.    < end of copied text >    EKG:  MEDICATIONS  (STANDING):  acetaminophen   Tablet .. 650 milliGRAM(s) Oral every 6 hours  ALBUTerol/ipratropium for Nebulization 3 milliLiter(s) Nebulizer every 6 hours  aspirin enteric coated 325 milliGRAM(s) Oral daily  atorvastatin 40 milliGRAM(s) Oral at bedtime  chlorhexidine 4% Liquid 1 Application(s) Topical <User Schedule>  dextrose 5%. 1000 milliLiter(s) (50 mL/Hr) IV Continuous <Continuous>  dextrose 50% Injectable 50 milliLiter(s) IV Push every 15 minutes  dextrose 50% Injectable 25 milliLiter(s) IV Push every 15 minutes  docusate sodium 100 milliGRAM(s) Oral three times a day  famotidine    Tablet 20 milliGRAM(s) Oral two times a day  guaiFENesin  milliGRAM(s) Oral every 12 hours  heparin  Injectable 5000 Unit(s) SubCutaneous every 8 hours  insulin lispro (HumaLOG) corrective regimen sliding scale   SubCutaneous three times a day before meals  isosorbide   dinitrate Tablet (ISORDIL) 5 milliGRAM(s) Oral three times a day  magnesium sulfate  IVPB 1 Gram(s) IV Intermittent every 12 hours  metoprolol tartrate 25 milliGRAM(s) Oral two times a day  polyethylene glycol 3350 17 Gram(s) Oral daily  sodium chloride 0.9%. 1000 milliLiter(s) (20 mL/Hr) IV Continuous <Continuous>    MEDICATIONS  (PRN):  dextrose 40% Gel 15 Gram(s) Oral once PRN Blood Glucose LESS THAN 70 milliGRAM(s)/deciliter  glucagon  Injectable 1 milliGRAM(s) IntraMuscular once PRN Glucose LESS THAN 70 milligrams/deciliter  ondansetron Injectable 4 milliGRAM(s) IV Push every 6 hours PRN Nausea and/or Vomiting  oxyCODONE    IR 10 milliGRAM(s) Oral every 4 hours PRN Severe Pain (7 - 10)  oxyCODONE    IR 5 milliGRAM(s) Oral every 4 hours PRN Moderate Pain (4 - 6)    HEPARIN:  [x] YES [] NO  Dose: XX UNITS/HR UNITS Q8H  LOVENOX:[] YES [x] NO  Dose: XX mg Q24H  COUMADIN: []  YES [x] NO  Dose: XX mg  Q24H  SCD's: YES b/l  GI Prophylaxis: Protonix [], Pepcid [x], None [], (Contra-indication:.....)    Post-Op Beta-Blockers: Yes [x], No[], If No, then contraindication:  Post-Op Aspirin: Yes [x],  No [], If No, then contraindication:  Post-Op Statin: Yes [x], No[], If No, then contraindication:    Assessment/Plan:  63y Male status-post CABGx3 with left radial harvest pod#2  - Case and plan discussed with CTU Intensivist and CT Surgeon - Dr. Giron/Autumn/Chyna   - Continue CTU supportive care    - Continue DVT/GI prophylaxis  - Incentive Spirometry 10 times an hour  - Continue to advance physical activity as tolerated and continue PT/OT as directed  1. CAD: Continue ASA, statin, BB, cont isordil to prevent radial vasospasm  2. HTN: cont isordil and lopressor  3. A. Fib prophylaxis: mag 1gm bid, lopressor   4. COPD/Hypoxia: wean o2 as tolerated, lasix for fluid overload, nebs, mucinex  5. DM/Glucose Control: not a diabetic, fs in good range- d/c insulin sliding scale    Social Service Disposition:  home

## 2019-04-24 NOTE — PROGRESS NOTE ADULT - SUBJECTIVE AND OBJECTIVE BOX
Over Night Events:  on [place] no chest pain no arrthmias slight SOB      ROS:  No N no V  no abd pain   no palpitation no fever     HPI:  64 yo M h/o HTN has been c/o chest pain had + [place] cardiac cath (19 Apr 2019 09:38)    No Known Allergies    PHYSICAL EXAM    ICU Vital Signs Last 24 Hrs  T(C): 36.8 (24 Apr 2019 08:00), Max: 37.4 (23 Apr 2019 12:00)  T(F): 98.2 (24 Apr 2019 08:00), Max: 99.3 (23 Apr 2019 12:00)  HR: 78 (24 Apr 2019 09:00) (72 - 82)  BP: 114/56 (24 Apr 2019 08:00) (110/56 - 133/65)  BP(mean): 70 (24 Apr 2019 08:00) (70 - 86)  ABP: 120/54 (23 Apr 2019 17:00) (116/50 - 132/62)  ABP(mean): 74 (23 Apr 2019 17:00) (68 - 82)  RR: 26 (24 Apr 2019 09:00) (15 - 28)  SpO2: 96% (24 Apr 2019 09:00) (93% - 100%)    No Known Allergies    General:AO x3  HEENT:           Nl     Lymph Nodes: NO cervical LN   Lungs: Bilateral BS  Cardiovascular: Regular   Abdomen: Soft, Positive BS  Extremities: No clubbing   Skin: Nl  Neurological: Nl    04-23-19 @ 07:01 - 04-24-19 @ 07:00  --------------------------------------------------------  IN: 1454 mL / OUT: 2204 mL / NET: -750 mL    04-24-19 @ 07:01  -  04-24-19 @ 09:41  --------------------------------------------------------  IN: 750 mL / OUT: 300 mL / NET: 450 mL        LABS:                          8.5    10.66 )-----------( 132      ( 24 Apr 2019 01:30 )             25.6                                               04-24    140  |  106  |  21<H>  ----------------------------<  125<H>  3.9   |  25  |  1.3    Ca    8.4<L>      24 Apr 2019 01:30  Mg     2.4     04-24    TPro  5.5<L>  /  Alb  4.1  /  TBili  0.5  /  DBili  x   /  AST  34  /  ALT  14  /  AlkPhos  36  04-24      PT/INR - ( 24 Apr 2019 01:30 )   PT: 12.90 sec;   INR: 1.12 ratio         PTT - ( 24 Apr 2019 01:30 )  PTT:36.7 sec                                                                                     LIVER FUNCTIONS - ( 24 Apr 2019 01:30 )  Alb: 4.1 g/dL / Pro: 5.5 g/dL / ALK PHOS: 36 U/L / ALT: 14 U/L / AST: 34 U/L / GGT: x                                                                                                                                   ABG - ( 23 Apr 2019 04:13 )  pH, Arterial: 7.35  pH, Blood: x     /  pCO2: 42    /  pO2: 95    / HCO3: 23    / Base Excess: -2.4  /  SaO2: 98                  MEDICATIONS  (STANDING):  acetaminophen   Tablet .. 650 milliGRAM(s) Oral every 6 hours  ALBUTerol/ipratropium for Nebulization 3 milliLiter(s) Nebulizer every 6 hours  aspirin enteric coated 325 milliGRAM(s) Oral daily  atorvastatin 40 milliGRAM(s) Oral at bedtime  chlorhexidine 4% Liquid 1 Application(s) Topical <User Schedule>  dextrose 5%. 1000 milliLiter(s) (50 mL/Hr) IV Continuous <Continuous>  dextrose 50% Injectable 50 milliLiter(s) IV Push every 15 minutes  dextrose 50% Injectable 25 milliLiter(s) IV Push every 15 minutes  docusate sodium 100 milliGRAM(s) Oral three times a day  famotidine    Tablet 20 milliGRAM(s) Oral two times a day  guaiFENesin  milliGRAM(s) Oral every 12 hours  heparin  Injectable 5000 Unit(s) SubCutaneous every 8 hours  insulin lispro (HumaLOG) corrective regimen sliding scale   SubCutaneous three times a day before meals  isosorbide   dinitrate Tablet (ISORDIL) 5 milliGRAM(s) Oral three times a day  magnesium sulfate  IVPB 1 Gram(s) IV Intermittent every 12 hours  metoprolol tartrate 25 milliGRAM(s) Oral two times a day  polyethylene glycol 3350 17 Gram(s) Oral daily  senna 2 Tablet(s) Oral once  sodium chloride 0.9%. 1000 milliLiter(s) (20 mL/Hr) IV Continuous <Continuous>    MEDICATIONS  (PRN):  dextrose 40% Gel 15 Gram(s) Oral once PRN Blood Glucose LESS THAN 70 milliGRAM(s)/deciliter  glucagon  Injectable 1 milliGRAM(s) IntraMuscular once PRN Glucose LESS THAN 70 milligrams/deciliter  ondansetron Injectable 4 milliGRAM(s) IV Push every 6 hours PRN Nausea and/or Vomiting  oxyCODONE    IR 10 milliGRAM(s) Oral every 4 hours PRN Severe Pain (7 - 10)  oxyCODONE    IR 5 milliGRAM(s) Oral every 4 hours PRN Moderate Pain (4 - 6)      Xrays:                                                                                     ECHO    mild congestion small L eff

## 2019-04-24 NOTE — DISCHARGE NOTE NURSING/CASE MANAGEMENT/SOCIAL WORK - NSDCPEWEB_GEN_ALL_CORE
[place] website --- [email or web address] for Tobacco [email or web address] website --- [email or web address]

## 2019-04-24 NOTE — DISCHARGE NOTE NURSING/CASE MANAGEMENT/SOCIAL WORK - NSDCPEEMAIL_GEN_ALL_CORE
[facility] for Tobacco Control email [email or web address]

## 2019-04-24 NOTE — PROGRESS NOTE ADULT - ASSESSMENT
62 yo M h/o HTN has been c/o chest pain had + stress test [place]pt [place] for elective cardiac cath (19 Apr 2019 09:38)  1- S/P CABG   Radial graft  pod# 2  2- essential HTN off  NTG drip  3- anemai of acute blood loss  4- Thrombocytopenia  5- fluid over load pulmonary congestion   Plan  ambulate reevaluate chest tubes in afternoon  remove swan and a line     - continue  asa BBlockers and Isordil 5 mg q8  - lasix 40 iv x1     ambulate

## 2019-04-25 LAB
ANION GAP SERPL CALC-SCNC: 10 MMOL/L — SIGNIFICANT CHANGE UP (ref 7–14)
BASOPHILS # BLD AUTO: 0.02 K/UL — SIGNIFICANT CHANGE UP (ref 0–0.2)
BASOPHILS NFR BLD AUTO: 0.2 % — SIGNIFICANT CHANGE UP (ref 0–1)
BUN SERPL-MCNC: 21 MG/DL — HIGH (ref 10–20)
CALCIUM SERPL-MCNC: 8.2 MG/DL — LOW (ref 8.5–10.1)
CHLORIDE SERPL-SCNC: 105 MMOL/L — SIGNIFICANT CHANGE UP (ref 98–110)
CO2 SERPL-SCNC: 26 MMOL/L — SIGNIFICANT CHANGE UP (ref 17–32)
CREAT SERPL-MCNC: 1.2 MG/DL — SIGNIFICANT CHANGE UP (ref 0.7–1.5)
EOSINOPHIL # BLD AUTO: 0.08 K/UL — SIGNIFICANT CHANGE UP (ref 0–0.7)
EOSINOPHIL NFR BLD AUTO: 0.8 % — SIGNIFICANT CHANGE UP (ref 0–8)
GLUCOSE BLDC GLUCOMTR-MCNC: 110 MG/DL — HIGH (ref 70–99)
GLUCOSE SERPL-MCNC: 109 MG/DL — HIGH (ref 70–99)
HCT VFR BLD CALC: 25.3 % — LOW (ref 42–52)
HGB BLD-MCNC: 8.4 G/DL — LOW (ref 14–18)
IMM GRANULOCYTES NFR BLD AUTO: 0.4 % — HIGH (ref 0.1–0.3)
LYMPHOCYTES # BLD AUTO: 1.05 K/UL — LOW (ref 1.2–3.4)
LYMPHOCYTES # BLD AUTO: 11.1 % — LOW (ref 20.5–51.1)
MAGNESIUM SERPL-MCNC: 2.2 MG/DL — SIGNIFICANT CHANGE UP (ref 1.8–2.4)
MCHC RBC-ENTMCNC: 28.9 PG — SIGNIFICANT CHANGE UP (ref 27–31)
MCHC RBC-ENTMCNC: 33.2 G/DL — SIGNIFICANT CHANGE UP (ref 32–37)
MCV RBC AUTO: 86.9 FL — SIGNIFICANT CHANGE UP (ref 80–94)
MONOCYTES # BLD AUTO: 1.04 K/UL — HIGH (ref 0.1–0.6)
MONOCYTES NFR BLD AUTO: 11 % — HIGH (ref 1.7–9.3)
NEUTROPHILS # BLD AUTO: 7.24 K/UL — HIGH (ref 1.4–6.5)
NEUTROPHILS NFR BLD AUTO: 76.5 % — HIGH (ref 42.2–75.2)
NRBC # BLD: 0 /100 WBCS — SIGNIFICANT CHANGE UP (ref 0–0)
PLATELET # BLD AUTO: 148 K/UL — SIGNIFICANT CHANGE UP (ref 130–400)
POTASSIUM SERPL-MCNC: 3.8 MMOL/L — SIGNIFICANT CHANGE UP (ref 3.5–5)
POTASSIUM SERPL-SCNC: 3.8 MMOL/L — SIGNIFICANT CHANGE UP (ref 3.5–5)
RBC # BLD: 2.91 M/UL — LOW (ref 4.7–6.1)
RBC # FLD: 13.1 % — SIGNIFICANT CHANGE UP (ref 11.5–14.5)
SODIUM SERPL-SCNC: 141 MMOL/L — SIGNIFICANT CHANGE UP (ref 135–146)
WBC # BLD: 9.47 K/UL — SIGNIFICANT CHANGE UP (ref 4.8–10.8)
WBC # FLD AUTO: 9.47 K/UL — SIGNIFICANT CHANGE UP (ref 4.8–10.8)

## 2019-04-25 PROCEDURE — 93010 ELECTROCARDIOGRAM REPORT: CPT

## 2019-04-25 PROCEDURE — 71045 X-RAY EXAM CHEST 1 VIEW: CPT | Mod: 26

## 2019-04-25 RX ORDER — POTASSIUM CHLORIDE 20 MEQ
20 PACKET (EA) ORAL ONCE
Qty: 0 | Refills: 0 | Status: COMPLETED | OUTPATIENT
Start: 2019-04-25 | End: 2019-04-25

## 2019-04-25 RX ORDER — POTASSIUM CHLORIDE 20 MEQ
40 PACKET (EA) ORAL ONCE
Qty: 0 | Refills: 0 | Status: COMPLETED | OUTPATIENT
Start: 2019-04-25 | End: 2019-04-25

## 2019-04-25 RX ORDER — FUROSEMIDE 40 MG
40 TABLET ORAL ONCE
Qty: 0 | Refills: 0 | Status: COMPLETED | OUTPATIENT
Start: 2019-04-25 | End: 2019-04-25

## 2019-04-25 RX ADMIN — FAMOTIDINE 20 MILLIGRAM(S): 10 INJECTION INTRAVENOUS at 17:26

## 2019-04-25 RX ADMIN — FAMOTIDINE 20 MILLIGRAM(S): 10 INJECTION INTRAVENOUS at 05:41

## 2019-04-25 RX ADMIN — Medication 25 MILLIGRAM(S): at 05:41

## 2019-04-25 RX ADMIN — Medication 25 MILLIGRAM(S): at 17:26

## 2019-04-25 RX ADMIN — HEPARIN SODIUM 5000 UNIT(S): 5000 INJECTION INTRAVENOUS; SUBCUTANEOUS at 05:41

## 2019-04-25 RX ADMIN — Medication 650 MILLIGRAM(S): at 07:19

## 2019-04-25 RX ADMIN — ATORVASTATIN CALCIUM 40 MILLIGRAM(S): 80 TABLET, FILM COATED ORAL at 21:58

## 2019-04-25 RX ADMIN — Medication 100 MILLIEQUIVALENT(S): at 05:40

## 2019-04-25 RX ADMIN — Medication 100 GRAM(S): at 17:26

## 2019-04-25 RX ADMIN — ISOSORBIDE DINITRATE 5 MILLIGRAM(S): 5 TABLET ORAL at 05:41

## 2019-04-25 RX ADMIN — Medication 40 MILLIGRAM(S): at 11:56

## 2019-04-25 RX ADMIN — Medication 100 GRAM(S): at 05:40

## 2019-04-25 RX ADMIN — Medication 40 MILLIEQUIVALENT(S): at 11:56

## 2019-04-25 RX ADMIN — Medication 650 MILLIGRAM(S): at 11:39

## 2019-04-25 RX ADMIN — Medication 100 MILLIGRAM(S): at 21:59

## 2019-04-25 RX ADMIN — Medication 600 MILLIGRAM(S): at 05:41

## 2019-04-25 RX ADMIN — Medication 650 MILLIGRAM(S): at 13:18

## 2019-04-25 RX ADMIN — Medication 650 MILLIGRAM(S): at 05:40

## 2019-04-25 RX ADMIN — Medication 325 MILLIGRAM(S): at 11:38

## 2019-04-25 RX ADMIN — Medication 650 MILLIGRAM(S): at 00:53

## 2019-04-25 RX ADMIN — Medication 600 MILLIGRAM(S): at 17:26

## 2019-04-25 RX ADMIN — Medication 100 MILLIGRAM(S): at 05:41

## 2019-04-25 RX ADMIN — HEPARIN SODIUM 5000 UNIT(S): 5000 INJECTION INTRAVENOUS; SUBCUTANEOUS at 21:59

## 2019-04-25 RX ADMIN — Medication 100 MILLIGRAM(S): at 14:45

## 2019-04-25 RX ADMIN — POLYETHYLENE GLYCOL 3350 17 GRAM(S): 17 POWDER, FOR SOLUTION ORAL at 11:38

## 2019-04-25 NOTE — PROGRESS NOTE ADULT - SUBJECTIVE AND OBJECTIVE BOX
OPERATIVE PROCEDURE(s):                POD #                       SURGEON(s): THERESE Clemente  SUBJECTIVE ASSESSMENT: 63y Male patient seen and examined at bedside.    Vital Signs Last 24 Hrs  T(F): 98.2 (25 Apr 2019 08:00), Max: 99.2 (24 Apr 2019 13:00)  HR: 76 (25 Apr 2019 08:00) (76 - 82)  BP: 121/61 (25 Apr 2019 06:55) (96/46 - 142/63)  BP(mean): 85 (25 Apr 2019 06:55) (60 - 85)  RR: 21 (25 Apr 2019 08:00) (16 - 28)  SpO2: 98% (25 Apr 2019 08:00) (92% - 99%)    I&O's Detail    24 Apr 2019 07:01  -  25 Apr 2019 07:00  --------------------------------------------------------  IN:    IV PiggyBack: 300 mL    Oral Fluid: 1220 mL  Total IN: 1520 mL    OUT:    Voided: 2775 mL  Total OUT: 2775 mL    Net: I&O's Detail    23 Apr 2019 07:01  -  24 Apr 2019 07:00  --------------------------------------------------------  Total NET: -750 mL    24 Apr 2019 07:01  -  25 Apr 2019 07:00  --------------------------------------------------------  Total NET: -1255 mL      CAPILLARY BLOOD GLUCOSE  POCT Blood Glucose.: 110 mg/dL (25 Apr 2019 06:30)  POCT Blood Glucose.: 109 mg/dL (24 Apr 2019 22:29)      Physical Exam:  General: NAD; A&Ox3  Cardiac: S1/S2, RRR, no murmur, no rubs  Lungs: unlabored respirations, CTA b/l, no wheeze, no rales, no crackles  Abdomen: Soft/NT/ND; positive bowel sounds x 4  Sternum: Intact, no click, incision healing well with no drainage  Incisions: Incisions clean/dry/intact  Extremities: No edema b/l lower extremities; good capillary refill; no cyanosis; palpable 1+ pedal pulses b/l    Central Venous Catheter: Yes[]  No[] , If Yes indication:                    Day #  Hollis Catheter: Yes  [] , No  [] , If yes indication:                                 Day #  NGT: Yes [] No [] ,    If Yes Placement:                                                   Day #  EPICARDIAL WIRES:  [] YES [] NO                                                            Day #  BOWEL MOVEMENT:  [] YES [] NO, If No, Timing since last BM Day #  CHEST TUBE(Left/Right):  [] YES [] NO, If yes -  AIR LEAKS:  [] YES [] NO        LABS:                        8.4<L>  9.47  )-----------( 148      ( 25 Apr 2019 02:00 )             25.3<L>                        8.5<L>  10.66 )-----------( 132      ( 24 Apr 2019 01:30 )             25.6<L>    04-25    141  |  105  |  21<H>  ----------------------------<  109<H>  3.8   |  26  |  1.2    04-24    140  |  106  |  21<H>  ----------------------------<  125<H>  3.9   |  25  |  1.3    Ca    8.2<L>      25 Apr 2019 02:00  Mg     2.2     04-25    RADIOLOGY & ADDITIONAL TESTS:  CXR:   EKG:  Allergies    No Known Allergies    Intolerances      MEDICATIONS  (STANDING):  acetaminophen   Tablet .. 650 milliGRAM(s) Oral every 6 hours  ALBUTerol/ipratropium for Nebulization 3 milliLiter(s) Nebulizer every 6 hours  aspirin enteric coated 325 milliGRAM(s) Oral daily  atorvastatin 40 milliGRAM(s) Oral at bedtime  chlorhexidine 4% Liquid 1 Application(s) Topical <User Schedule>  dextrose 5%. 1000 milliLiter(s) (50 mL/Hr) IV Continuous <Continuous>  dextrose 50% Injectable 50 milliLiter(s) IV Push every 15 minutes  dextrose 50% Injectable 25 milliLiter(s) IV Push every 15 minutes  docusate sodium 100 milliGRAM(s) Oral three times a day  famotidine    Tablet 20 milliGRAM(s) Oral two times a day  guaiFENesin  milliGRAM(s) Oral every 12 hours  heparin  Injectable 5000 Unit(s) SubCutaneous every 8 hours  isosorbide   dinitrate Tablet (ISORDIL) 5 milliGRAM(s) Oral three times a day  magnesium sulfate  IVPB 1 Gram(s) IV Intermittent every 12 hours  metoprolol tartrate 25 milliGRAM(s) Oral two times a day  polyethylene glycol 3350 17 Gram(s) Oral daily  sodium chloride 0.9%. 1000 milliLiter(s) (20 mL/Hr) IV Continuous <Continuous>    MEDICATIONS  (PRN):  dextrose 40% Gel 15 Gram(s) Oral once PRN Blood Glucose LESS THAN 70 milliGRAM(s)/deciliter  glucagon  Injectable 1 milliGRAM(s) IntraMuscular once PRN Glucose LESS THAN 70 milligrams/deciliter  ondansetron Injectable 4 milliGRAM(s) IV Push every 6 hours PRN Nausea and/or Vomiting  oxyCODONE    IR 10 milliGRAM(s) Oral every 4 hours PRN Severe Pain (7 - 10)  oxyCODONE    IR 5 milliGRAM(s) Oral every 4 hours PRN Moderate Pain (4 - 6)      Pharmacologic DVT Prophylaxis: [] YES, []NO: Contraindication:   [] HEPARIN: Dose: XX mg  Q24H    [] LOVENOX: Dose: XX mg  Q24H                 SCD's: YES b/l    GI Prophylaxis: Protonix [], Pepcid []    Post-Op Beta-Blockers: []Yes, []No: contraindication:  Post-Op Nitrate: []Yes, []No: contraindication:  Post-Op Aspirin: []Yes,  []No: contraindication:  Post-Op Statin: []Yes, []No: contraindication:      Ambulation/Activity Status:    Assessment/Plan:  63y Male status-post  - Case and plan discussed with CTU Intensivist and CT Surgeon - Dr. Clemente   - Continue CTU supportive care and ongoing plan of care as per continuing CTU rounds.    - Continue DVT/GI prophylaxis  - Incentive Spirometry 10 times an hour  - Continue to advance physical activity as tolerated and continue PT/OT as directed  1. CAD: Continue ASA, statin, BB  2. HTN:   3. A. Fib:   4. COPD/Hypoxia:   5. DM/Glucose Control:     Social Service Disposition: OPERATIVE PROCEDURE(s):                POD #                       SURGEON(s): THERESE Clemente  SUBJECTIVE ASSESSMENT: 63y Male patient seen and examined at bedside.    Vital Signs Last 24 Hrs  T(F): 98.2 (25 Apr 2019 08:00), Max: 99.2 (24 Apr 2019 13:00)  HR: 76 (25 Apr 2019 08:00) (76 - 82)  BP: 121/61 (25 Apr 2019 06:55) (96/46 - 142/63)  BP(mean): 85 (25 Apr 2019 06:55) (60 - 85)  RR: 21 (25 Apr 2019 08:00) (16 - 28)  SpO2: 98% (25 Apr 2019 08:00) (92% - 99%)    I&O's Detail    24 Apr 2019 07:01  -  25 Apr 2019 07:00  --------------------------------------------------------  IN:    IV PiggyBack: 300 mL    Oral Fluid: 1220 mL  Total IN: 1520 mL    OUT:    Voided: 2775 mL  Total OUT: 2775 mL    Net: I&O's Detail    23 Apr 2019 07:01  -  24 Apr 2019 07:00  --------------------------------------------------------  Total NET: -750 mL    24 Apr 2019 07:01  -  25 Apr 2019 07:00  --------------------------------------------------------  Total NET: -1255 mL      CAPILLARY BLOOD GLUCOSE  POCT Blood Glucose.: 110 mg/dL (25 Apr 2019 06:30)  POCT Blood Glucose.: 109 mg/dL (24 Apr 2019 22:29)      Physical Exam:  General: NAD; A&Ox3  Cardiac: S1/S2, RRR, no murmur, no rubs  Lungs: unlabored respirations, CTA b/l, no wheeze, no rales, no crackles  Abdomen: Soft/NT/ND; positive bowel sounds x 4  Sternum: Intact, no click, incision healing well with no drainage  Incisions: Incisions clean/dry/intact  Extremities: No edema b/l lower extremities; good capillary refill; no cyanosis; palpable 1+ pedal pulses b/l    Central Venous Catheter: Yes[]  No[] , If Yes indication:                    Day #  Hollis Catheter: Yes  [] , No  [] , If yes indication:                                 Day #  NGT: Yes [] No [] ,    If Yes Placement:                                                   Day #  EPICARDIAL WIRES:  [] YES [] NO                                                            Day #  BOWEL MOVEMENT:  [] YES [] NO, If No, Timing since last BM Day #  CHEST TUBE(Left/Right):  [] YES [] NO, If yes -  AIR LEAKS:  [] YES [] NO        LABS:                        8.4<L>  9.47  )-----------( 148      ( 25 Apr 2019 02:00 )             25.3<L>                        8.5<L>  10.66 )-----------( 132      ( 24 Apr 2019 01:30 )             25.6<L>    04-25    141  |  105  |  21<H>  ----------------------------<  109<H>  3.8   |  26  |  1.2    04-24    140  |  106  |  21<H>  ----------------------------<  125<H>  3.9   |  25  |  1.3    Ca    8.2<L>      25 Apr 2019 02:00  Mg     2.2     04-25    RADIOLOGY & ADDITIONAL TESTS:  CXR:   EKG:  Allergies    No Known Allergies    Intolerances    MEDICATIONS  (STANDING):  acetaminophen   Tablet .. 650 milliGRAM(s) Oral every 6 hours  ALBUTerol/ipratropium for Nebulization 3 milliLiter(s) Nebulizer every 6 hours  aspirin enteric coated 325 milliGRAM(s) Oral daily  atorvastatin 40 milliGRAM(s) Oral at bedtime  chlorhexidine 4% Liquid 1 Application(s) Topical <User Schedule>  dextrose 5%. 1000 milliLiter(s) (50 mL/Hr) IV Continuous <Continuous>  dextrose 50% Injectable 50 milliLiter(s) IV Push every 15 minutes  dextrose 50% Injectable 25 milliLiter(s) IV Push every 15 minutes  docusate sodium 100 milliGRAM(s) Oral three times a day  famotidine    Tablet 20 milliGRAM(s) Oral two times a day  guaiFENesin  milliGRAM(s) Oral every 12 hours  heparin  Injectable 5000 Unit(s) SubCutaneous every 8 hours  isosorbide   dinitrate Tablet (ISORDIL) 5 milliGRAM(s) Oral three times a day  magnesium sulfate  IVPB 1 Gram(s) IV Intermittent every 12 hours  metoprolol tartrate 25 milliGRAM(s) Oral two times a day  polyethylene glycol 3350 17 Gram(s) Oral daily  sodium chloride 0.9%. 1000 milliLiter(s) (20 mL/Hr) IV Continuous <Continuous>    MEDICATIONS  (PRN):  dextrose 40% Gel 15 Gram(s) Oral once PRN Blood Glucose LESS THAN 70 milliGRAM(s)/deciliter  glucagon  Injectable 1 milliGRAM(s) IntraMuscular once PRN Glucose LESS THAN 70 milligrams/deciliter  ondansetron Injectable 4 milliGRAM(s) IV Push every 6 hours PRN Nausea and/or Vomiting  oxyCODONE    IR 10 milliGRAM(s) Oral every 4 hours PRN Severe Pain (7 - 10)  oxyCODONE    IR 5 milliGRAM(s) Oral every 4 hours PRN Moderate Pain (4 - 6)    Pharmacologic DVT Prophylaxis: [] YES, []NO: Contraindication:   [] HEPARIN: Dose: XX mg  Q24H    [] LOVENOX: Dose: XX mg  Q24H                 SCD's: YES b/l    GI Prophylaxis: Protonix [], Pepcid []    Post-Op Beta-Blockers: []Yes, []No: contraindication:  Post-Op Nitrate: []Yes, []No: contraindication:  Post-Op Aspirin: []Yes,  []No: contraindication:  Post-Op Statin: []Yes, []No: contraindication:    Ambulation/Activity Status:    Assessment/Plan:  63y Male status-post  - Case and plan discussed with CTU Intensivist and CT Surgeon - Dr. Clemente   - Continue CTU supportive care and ongoing plan of care as per continuing CTU rounds.    - Continue DVT/GI prophylaxis  - Incentive Spirometry 10 times an hour  - Continue to advance physical activity as tolerated and continue PT/OT as directed  1. CAD: Continue ASA, statin, BB  2. HTN:   3. A. Fib:   4. COPD/Hypoxia:   5. DM/Glucose Control:     Social Service Disposition:

## 2019-04-25 NOTE — PROGRESS NOTE ADULT - ATTENDING COMMENTS
POD 1 after C3IMA+radial  doing well  start ASA/SQH/BB  Cont pulmonary toilet, Chest PT, pain control, Incentive spirometry  OOB ambulate  case d/w CTU team  d/c CT today/d/c shanna/khari
POD 3 after C3IMA+radial  doing well  start ASA/SQH/BB/nitro for radial  Titrate BB up as tolerated  Cont pulmonary toilet, Chest PT, pain control, Incentive spirometry  start mild diuresis, negative 1250cc/24hrs  OOB ambulate  case d/w CTU team
62 yo M h/o HTN has been c/o chest pain had + [place] cardiac cath, which revealed severe CAD with left main involvement. Patient admitted post-procedure for myocardial revascularization via CABG    Patient was seen and examined  [facility] reviewed  patient is advised CABG  will initiate preop workup  risks and benefits of the procedure were explained to the patient  pt and his wife verbalized understanding and aggred to proceed with surgery
POD 2 after C3IMA+radial  doing well  start ASA/SQH/BB/nitro for radial  Titrate BB up as tolerated  Cont pulmonary toilet, Chest PT, pain control, Incentive spirometry  start mild diuresis, negative 800cc /24hrs  OOB ambulate  case d/w CTU team

## 2019-04-25 NOTE — PROGRESS NOTE ADULT - ASSESSMENT
62 yo M h/o HTN has been c/o chest pain had + stress test [place] an [place]pt [place] for elective cardiac cath (19 Apr 2019 09:38)  1- S/P CABG   Radial graft  pod# 3  2- essential HTN on low dose BBlockers 25 q12  3- anemai of acute blood loss  4- Thrombocytopenia  5- fluid over load pulmonary congestion   Plan  ambulate reevaluate chest tubes in afternoon  remove swan and a line     - continue  asa BBlockers and Isordil 5 mg q8  - lasix 40 iv x1     ambulate

## 2019-04-25 NOTE — PROGRESS NOTE ADULT - SUBJECTIVE AND OBJECTIVE BOX
Over Night Events:  no events walking      ROS:  no N or V no chest pain no sob ambulating good extremites strenth  no palpitations   No Known Allergies    PHYSICAL EXAM    ICU Vital Signs Last 24 Hrs  T(C): 36.8 (25 Apr 2019 08:00), Max: 37.3 (24 Apr 2019 13:00)  T(F): 98.2 (25 Apr 2019 08:00), Max: 99.2 (24 Apr 2019 13:00)  HR: 86 (25 Apr 2019 10:00) (76 - 86)  BP: 120/53 (25 Apr 2019 10:00) (96/46 - 127/53)  BP(mean): 73 (25 Apr 2019 10:00) (60 - 85)  ABP: --  ABP(mean): --  RR: 21 (25 Apr 2019 10:00) (16 - 28)  SpO2: 97% (25 Apr 2019 10:00) (92% - 99%)    No Known Allergies    General:AO x3  HEENT:           Nl     Lymph Nodes: NO cervical LN   Lungs: Bilateral BS  Cardiovascular: Regular   Abdomen: Soft, Positive BS  Extremities: No clubbing   Skin: Nl  Neurological: Nl    04-24-19 @ 07:01  -  04-25-19 @ 07:00  --------------------------------------------------------  IN: 1520 mL / OUT: 2775 mL / NET: -1255 mL    04-25-19 @ 07:01 - 04-25-19 @ 10:42  --------------------------------------------------------  IN: 150 mL / OUT: 0 mL / NET: 150 mL        LABS:                          8.4    9.47  )-----------( 148      ( 25 Apr 2019 02:00 )             25.3                                               04-25    141  |  105  |  21<H>  ----------------------------<  109<H>  3.8   |  26  |  1.2    Ca    8.2<L>      25 Apr 2019 02:00  Mg     2.2     04-25    TPro  5.5<L>  /  Alb  4.1  /  TBili  0.5  /  DBili  x   /  AST  34  /  ALT  14  /  AlkPhos  36  04-24      PT/INR - ( 24 Apr 2019 01:30 )   PT: 12.90 sec;   INR: 1.12 ratio         PTT - ( 24 Apr 2019 01:30 )  PTT:36.7 sec                                                                                     LIVER FUNCTIONS - ( 24 Apr 2019 01:30 )  Alb: 4.1 g/dL / Pro: 5.5 g/dL / ALK PHOS: 36 U/L / ALT: 14 U/L / AST: 34 U/L / GGT: x                                                                                                                                       MEDICATIONS  (STANDING):  acetaminophen   Tablet .. 650 milliGRAM(s) Oral every 6 hours  ALBUTerol/ipratropium for Nebulization 3 milliLiter(s) Nebulizer every 6 hours  aspirin enteric coated 325 milliGRAM(s) Oral daily  atorvastatin 40 milliGRAM(s) Oral at bedtime  chlorhexidine 4% Liquid 1 Application(s) Topical <User Schedule>  dextrose 5%. 1000 milliLiter(s) (50 mL/Hr) IV Continuous <Continuous>  dextrose 50% Injectable 50 milliLiter(s) IV Push every 15 minutes  dextrose 50% Injectable 25 milliLiter(s) IV Push every 15 minutes  docusate sodium 100 milliGRAM(s) Oral three times a day  famotidine    Tablet 20 milliGRAM(s) Oral two times a day  guaiFENesin  milliGRAM(s) Oral every 12 hours  heparin  Injectable 5000 Unit(s) SubCutaneous every 8 hours  magnesium sulfate  IVPB 1 Gram(s) IV Intermittent every 12 hours  metoprolol tartrate 25 milliGRAM(s) Oral two times a day  polyethylene glycol 3350 17 Gram(s) Oral daily    MEDICATIONS  (PRN):  dextrose 40% Gel 15 Gram(s) Oral once PRN Blood Glucose LESS THAN 70 milliGRAM(s)/deciliter  glucagon  Injectable 1 milliGRAM(s) IntraMuscular once PRN Glucose LESS THAN 70 milligrams/deciliter  ondansetron Injectable 4 milliGRAM(s) IV Push every 6 hours PRN Nausea and/or Vomiting  oxyCODONE    IR 10 milliGRAM(s) Oral every 4 hours PRN Severe Pain (7 - 10)  oxyCODONE    IR 5 milliGRAM(s) Oral every 4 hours PRN Moderate Pain (4 - 6)      Xrays:                                                                                     ECHO    mild congestion

## 2019-04-26 VITALS
OXYGEN SATURATION: 99 % | HEART RATE: 92 BPM | RESPIRATION RATE: 23 BRPM | SYSTOLIC BLOOD PRESSURE: 124 MMHG | TEMPERATURE: 99 F | DIASTOLIC BLOOD PRESSURE: 67 MMHG

## 2019-04-26 PROBLEM — Z00.00 ENCOUNTER FOR PREVENTIVE HEALTH EXAMINATION: Status: ACTIVE | Noted: 2019-04-26

## 2019-04-26 PROBLEM — C67.9 MALIGNANT NEOPLASM OF BLADDER, UNSPECIFIED: Chronic | Status: ACTIVE | Noted: 2019-04-19

## 2019-04-26 LAB
ANION GAP SERPL CALC-SCNC: 13 MMOL/L — SIGNIFICANT CHANGE UP (ref 7–14)
BASOPHILS # BLD AUTO: 0.03 K/UL — SIGNIFICANT CHANGE UP (ref 0–0.2)
BASOPHILS NFR BLD AUTO: 0.4 % — SIGNIFICANT CHANGE UP (ref 0–1)
BUN SERPL-MCNC: 23 MG/DL — HIGH (ref 10–20)
CALCIUM SERPL-MCNC: 8.7 MG/DL — SIGNIFICANT CHANGE UP (ref 8.5–10.1)
CHLORIDE SERPL-SCNC: 105 MMOL/L — SIGNIFICANT CHANGE UP (ref 98–110)
CO2 SERPL-SCNC: 24 MMOL/L — SIGNIFICANT CHANGE UP (ref 17–32)
CREAT SERPL-MCNC: 1.2 MG/DL — SIGNIFICANT CHANGE UP (ref 0.7–1.5)
EOSINOPHIL # BLD AUTO: 0.18 K/UL — SIGNIFICANT CHANGE UP (ref 0–0.7)
EOSINOPHIL NFR BLD AUTO: 2.3 % — SIGNIFICANT CHANGE UP (ref 0–8)
GLUCOSE SERPL-MCNC: 106 MG/DL — HIGH (ref 70–99)
HCT VFR BLD CALC: 29.7 % — LOW (ref 42–52)
HGB BLD-MCNC: 9.9 G/DL — LOW (ref 14–18)
IMM GRANULOCYTES NFR BLD AUTO: 0.5 % — HIGH (ref 0.1–0.3)
LYMPHOCYTES # BLD AUTO: 1.48 K/UL — SIGNIFICANT CHANGE UP (ref 1.2–3.4)
LYMPHOCYTES # BLD AUTO: 18.5 % — LOW (ref 20.5–51.1)
MAGNESIUM SERPL-MCNC: 2.2 MG/DL — SIGNIFICANT CHANGE UP (ref 1.8–2.4)
MCHC RBC-ENTMCNC: 28.3 PG — SIGNIFICANT CHANGE UP (ref 27–31)
MCHC RBC-ENTMCNC: 33.3 G/DL — SIGNIFICANT CHANGE UP (ref 32–37)
MCV RBC AUTO: 84.9 FL — SIGNIFICANT CHANGE UP (ref 80–94)
MONOCYTES # BLD AUTO: 0.98 K/UL — HIGH (ref 0.1–0.6)
MONOCYTES NFR BLD AUTO: 12.3 % — HIGH (ref 1.7–9.3)
NEUTROPHILS # BLD AUTO: 5.29 K/UL — SIGNIFICANT CHANGE UP (ref 1.4–6.5)
NEUTROPHILS NFR BLD AUTO: 66 % — SIGNIFICANT CHANGE UP (ref 42.2–75.2)
NRBC # BLD: 0 /100 WBCS — SIGNIFICANT CHANGE UP (ref 0–0)
PLATELET # BLD AUTO: 213 K/UL — SIGNIFICANT CHANGE UP (ref 130–400)
POTASSIUM SERPL-MCNC: 3.9 MMOL/L — SIGNIFICANT CHANGE UP (ref 3.5–5)
POTASSIUM SERPL-SCNC: 3.9 MMOL/L — SIGNIFICANT CHANGE UP (ref 3.5–5)
RBC # BLD: 3.5 M/UL — LOW (ref 4.7–6.1)
RBC # FLD: 13 % — SIGNIFICANT CHANGE UP (ref 11.5–14.5)
SODIUM SERPL-SCNC: 142 MMOL/L — SIGNIFICANT CHANGE UP (ref 135–146)
WBC # BLD: 8 K/UL — SIGNIFICANT CHANGE UP (ref 4.8–10.8)
WBC # FLD AUTO: 8 K/UL — SIGNIFICANT CHANGE UP (ref 4.8–10.8)

## 2019-04-26 PROCEDURE — 71045 X-RAY EXAM CHEST 1 VIEW: CPT | Mod: 26

## 2019-04-26 RX ORDER — FUROSEMIDE 40 MG
1 TABLET ORAL
Qty: 3 | Refills: 0 | OUTPATIENT
Start: 2019-04-26 | End: 2019-04-28

## 2019-04-26 RX ORDER — METOPROLOL TARTRATE 50 MG
1 TABLET ORAL
Qty: 60 | Refills: 0 | OUTPATIENT
Start: 2019-04-26 | End: 2019-05-25

## 2019-04-26 RX ORDER — ATORVASTATIN CALCIUM 80 MG/1
1 TABLET, FILM COATED ORAL
Qty: 30 | Refills: 0 | OUTPATIENT
Start: 2019-04-26 | End: 2019-05-25

## 2019-04-26 RX ORDER — ASPIRIN/CALCIUM CARB/MAGNESIUM 324 MG
1 TABLET ORAL
Qty: 0 | Refills: 0 | COMMUNITY

## 2019-04-26 RX ORDER — FAMOTIDINE 10 MG/ML
1 INJECTION INTRAVENOUS
Qty: 60 | Refills: 0 | OUTPATIENT
Start: 2019-04-26 | End: 2019-05-25

## 2019-04-26 RX ORDER — IRBESARTAN 75 MG/1
1 TABLET ORAL
Qty: 0 | Refills: 0 | COMMUNITY

## 2019-04-26 RX ORDER — ASPIRIN/CALCIUM CARB/MAGNESIUM 324 MG
1 TABLET ORAL
Qty: 30 | Refills: 0 | OUTPATIENT
Start: 2019-04-26 | End: 2019-05-25

## 2019-04-26 RX ORDER — ISOSORBIDE MONONITRATE 60 MG/1
1 TABLET, EXTENDED RELEASE ORAL
Qty: 30 | Refills: 0 | OUTPATIENT
Start: 2019-04-26 | End: 2019-05-25

## 2019-04-26 RX ORDER — POTASSIUM CHLORIDE 20 MEQ
20 PACKET (EA) ORAL ONCE
Qty: 0 | Refills: 0 | Status: COMPLETED | OUTPATIENT
Start: 2019-04-26 | End: 2019-04-26

## 2019-04-26 RX ORDER — DOCUSATE SODIUM 100 MG
1 CAPSULE ORAL
Qty: 90 | Refills: 0 | OUTPATIENT
Start: 2019-04-26 | End: 2019-05-25

## 2019-04-26 RX ORDER — POTASSIUM CHLORIDE 20 MEQ
1 PACKET (EA) ORAL
Qty: 3 | Refills: 0 | OUTPATIENT
Start: 2019-04-26 | End: 2019-04-28

## 2019-04-26 RX ADMIN — Medication 100 GRAM(S): at 05:33

## 2019-04-26 RX ADMIN — Medication 25 MILLIGRAM(S): at 05:28

## 2019-04-26 RX ADMIN — Medication 20 MILLIEQUIVALENT(S): at 05:45

## 2019-04-26 RX ADMIN — FAMOTIDINE 20 MILLIGRAM(S): 10 INJECTION INTRAVENOUS at 05:28

## 2019-04-26 RX ADMIN — CHLORHEXIDINE GLUCONATE 1 APPLICATION(S): 213 SOLUTION TOPICAL at 05:29

## 2019-04-26 RX ADMIN — Medication 100 MILLIGRAM(S): at 05:28

## 2019-04-26 RX ADMIN — Medication 600 MILLIGRAM(S): at 05:28

## 2019-04-26 RX ADMIN — HEPARIN SODIUM 5000 UNIT(S): 5000 INJECTION INTRAVENOUS; SUBCUTANEOUS at 05:28

## 2019-04-26 NOTE — PROGRESS NOTE ADULT - SUBJECTIVE AND OBJECTIVE BOX
OPERATIVE PROCEDURE(s):                POD #                       63yMale  SURGEON(s): THERESE Clemente  SUBJECTIVE ASSESSMENT:   Vital Signs Last 24 Hrs  T(F): 98 (26 Apr 2019 08:15), Max: 98.7 (26 Apr 2019 00:42)  HR: 88 (26 Apr 2019 08:15) (82 - 94)  BP: 117/64 (26 Apr 2019 08:15) (100/59 - 141/74)  BP(mean): 79 (26 Apr 2019 08:15) (64 - 97)  RR: 20 (26 Apr 2019 08:15) (16 - 21)  SpO2: 99% (26 Apr 2019 08:15) (95% - 99%)-    I&O's Detail    25 Apr 2019 07:01  -  26 Apr 2019 07:00  --------------------------------------------------------  IN:    IV PiggyBack: 100 mL    Oral Fluid: 610 mL  Total IN: 710 mL    OUT:    Voided: 3000 mL  Total OUT: 3000 mL        Net:   I&O's Detail    24 Apr 2019 07:01  -  25 Apr 2019 07:00  --------------------------------------------------------  Total NET: -1255 mL      25 Apr 2019 07:01  -  26 Apr 2019 07:00  --------------------------------------------------------  Total NET: -2290 mL        CAPILLARY BLOOD GLUCOSE        Physical Exam:  General: NAD; A&Ox3/Patient is intubated and sedated  Cardiac: S1/S2, RRR, no murmur, no rubs  Lungs: unlabored respirations, CTA b/l, no wheeze, no rales, no crackles  Abdomen: Soft/NT/ND; positive bowel sounds x 4  Sternum: Intact, no click, incision healing well with no drainage  Incisions: Incisions clean/dry/intact  Extremities: No edema b/l lower extremities; good capillary refill; no cyanosis; palpable 1+ pedal pulses b/l    Central Venous Catheter: Yes[]  No[] , If Yes indication:           Day #  Hollis Catheter: Yes  [] , No  [] , If yes indication:                      Day #  NGT: Yes [] No [] ,    If Yes Placement:                                     Day #  EPICARDIAL WIRES:  [] YES [] NO                                              Day #  BOWEL MOVEMENT:  [] YES [] NO, If No, Timing since last BM:      Day #  CHEST TUBE(Left/Right):  [] YES [] NO, If yes -  AIR LEAKS:  [] YES [] NO        LABS:                        9.9<L>  8.00  )-----------( 213      ( 26 Apr 2019 04:45 )             29.7<L>                        8.4<L>  9.47  )-----------( 148      ( 25 Apr 2019 02:00 )             25.3<L>    04-26    142  |  105  |  23<H>  ----------------------------<  106<H>  3.9   |  24  |  1.2  04-25    141  |  105  |  21<H>  ----------------------------<  109<H>  3.8   |  26  |  1.2    Ca    8.7      26 Apr 2019 04:45  Mg     2.2     04-26    TPro  5.5<L> [6.0 - 8.0]  /  Alb  4.1 [3.5 - 5.2]  /  TBili  0.5 [0.2 - 1.2]  /  DBili  x   /  AST  34 [0 - 41]  /  ALT  14 [0 - 41]  /  AlkPhos  36 [30 - 115]  04-24          RADIOLOGY & ADDITIONAL TESTS:  CXR:  EKG:  MEDICATIONS  (STANDING):  ALBUTerol/ipratropium for Nebulization 3 milliLiter(s) Nebulizer every 6 hours  aspirin enteric coated 325 milliGRAM(s) Oral daily  atorvastatin 40 milliGRAM(s) Oral at bedtime  chlorhexidine 4% Liquid 1 Application(s) Topical <User Schedule>  dextrose 5%. 1000 milliLiter(s) (50 mL/Hr) IV Continuous <Continuous>  dextrose 50% Injectable 50 milliLiter(s) IV Push every 15 minutes  dextrose 50% Injectable 25 milliLiter(s) IV Push every 15 minutes  docusate sodium 100 milliGRAM(s) Oral three times a day  famotidine    Tablet 20 milliGRAM(s) Oral two times a day  guaiFENesin  milliGRAM(s) Oral every 12 hours  heparin  Injectable 5000 Unit(s) SubCutaneous every 8 hours  magnesium sulfate  IVPB 1 Gram(s) IV Intermittent every 12 hours  metoprolol tartrate 25 milliGRAM(s) Oral two times a day  polyethylene glycol 3350 17 Gram(s) Oral daily    MEDICATIONS  (PRN):  dextrose 40% Gel 15 Gram(s) Oral once PRN Blood Glucose LESS THAN 70 milliGRAM(s)/deciliter  glucagon  Injectable 1 milliGRAM(s) IntraMuscular once PRN Glucose LESS THAN 70 milligrams/deciliter  ondansetron Injectable 4 milliGRAM(s) IV Push every 6 hours PRN Nausea and/or Vomiting  oxyCODONE    IR 10 milliGRAM(s) Oral every 4 hours PRN Severe Pain (7 - 10)  oxyCODONE    IR 5 milliGRAM(s) Oral every 4 hours PRN Moderate Pain (4 - 6)    HEPARIN:  [] YES [] NO  Dose: XX UNITS/HR UNITS Q8H  LOVENOX:[] YES [] NO  Dose: XX mg Q24H  COUMADIN: []  YES [] NO  Dose: XX mg  Q24H  SCD's: YES b/l  GI Prophylaxis: Protonix [], Pepcid [], None [], (Contra-indication:.....)    Post-Op Beta-Blockers: Yes [], No[], If No, then contraindication:  Post-Op Aspirin: Yes [],  No [], If No, then contraindication:  Post-Op Statin: Yes [], No[], If No, then contraindication:  Allergies    No Known Allergies    Intolerances      Ambulation/Activity Status:    Assessment/Plan:  63y Male status-post .....  - Case and plan discussed with CTU Intensivist and CT Surgeon - Dr. Giron/Autumn/Chyna   - Continue CTU supportive care    - Continue DVT/GI prophylaxis  - Incentive Spirometry 10 times an hour  - Continue to advance physical activity as tolerated and continue PT/OT as directed  1. CAD: Continue ASA, statin, BB  2. HTN:   3. A. Fib:   4. COPD/Hypoxia:   5. DM/Glucose Control:     Social Service Disposition: OPERATIVE PROCEDURE(s): CABGx3 + left radial harvest               POD #   4                    63yMale  SURGEON(s): THERESE Clemente  SUBJECTIVE ASSESSMENT: pt seen and examined. no acute complaints   Vital Signs Last 24 Hrs  T(F): 98 (26 Apr 2019 08:15), Max: 98.7 (26 Apr 2019 00:42)  HR: 88 (26 Apr 2019 08:15) (82 - 94)  BP: 117/64 (26 Apr 2019 08:15) (100/59 - 141/74)  BP(mean): 79 (26 Apr 2019 08:15) (64 - 97)  RR: 20 (26 Apr 2019 08:15) (16 - 21)  SpO2: 99% (26 Apr 2019 08:15) (95% - 99%)-    I&O's Detail    25 Apr 2019 07:01  -  26 Apr 2019 07:00  --------------------------------------------------------  IN:    IV PiggyBack: 100 mL    Oral Fluid: 610 mL  Total IN: 710 mL    OUT:    Voided: 3000 mL  Total OUT: 3000 mL        Net:   I&O's Detail    24 Apr 2019 07:01  -  25 Apr 2019 07:00  --------------------------------------------------------  Total NET: -1255 mL      25 Apr 2019 07:01  -  26 Apr 2019 07:00  --------------------------------------------------------  Total NET: -2290 mL        CAPILLARY BLOOD GLUCOSE        Physical Exam:  General: NAD; A&Ox3  Cardiac: S1/S2, RRR, no murmur, no rubs  Lungs: unlabored respirations, CTA b/l, no wheeze, no rales, no crackles  Abdomen: Soft/NT/ND; positive bowel sounds x 4  Sternum: Intact, no click, incision healing well with no drainage  Incisions: Incisions clean/dry/intact  Extremities: No edema b/l lower extremities; good capillary refill; no cyanosis; palpable 1+ pedal pulses b/l    Central Venous Catheter: Yes[]  No[x] , If Yes indication:           Day #  Hollis Catheter: Yes  [] , No  [x] , If yes indication:                      Day #  NGT: Yes [] No [x] ,    If Yes Placement:                                     Day #  EPICARDIAL WIRES:  [] YES [x] NO                                              Day #  BOWEL MOVEMENT:  [x] YES [] NO, If No, Timing since last BM:      Day #  CHEST TUBE(Left/Right):  [] YES [x] NO, If yes -  AIR LEAKS:  [] YES [] NO        LABS:                        9.9<L>  8.00  )-----------( 213      ( 26 Apr 2019 04:45 )             29.7<L>                        8.4<L>  9.47  )-----------( 148      ( 25 Apr 2019 02:00 )             25.3<L>    04-26    142  |  105  |  23<H>  ----------------------------<  106<H>  3.9   |  24  |  1.2  04-25    141  |  105  |  21<H>  ----------------------------<  109<H>  3.8   |  26  |  1.2    Ca    8.7      26 Apr 2019 04:45  Mg     2.2     04-26    TPro  5.5<L> [6.0 - 8.0]  /  Alb  4.1 [3.5 - 5.2]  /  TBili  0.5 [0.2 - 1.2]  /  DBili  x   /  AST  34 [0 - 41]  /  ALT  14 [0 - 41]  /  AlkPhos  36 [30 - 115]  04-24      RADIOLOGY & ADDITIONAL TESTS:  CXR: < from: Xray Chest 1 View- PORTABLE-Routine (04.26.19 @ 04:16) >  Impression:      No significant interval change left basilar opacity/effusion    < end of copied text >    EKG:  MEDICATIONS  (STANDING):  ALBUTerol/ipratropium for Nebulization 3 milliLiter(s) Nebulizer every 6 hours  aspirin enteric coated 325 milliGRAM(s) Oral daily  atorvastatin 40 milliGRAM(s) Oral at bedtime  chlorhexidine 4% Liquid 1 Application(s) Topical <User Schedule>  dextrose 5%. 1000 milliLiter(s) (50 mL/Hr) IV Continuous <Continuous>  dextrose 50% Injectable 50 milliLiter(s) IV Push every 15 minutes  dextrose 50% Injectable 25 milliLiter(s) IV Push every 15 minutes  docusate sodium 100 milliGRAM(s) Oral three times a day  famotidine    Tablet 20 milliGRAM(s) Oral two times a day  guaiFENesin  milliGRAM(s) Oral every 12 hours  heparin  Injectable 5000 Unit(s) SubCutaneous every 8 hours  magnesium sulfate  IVPB 1 Gram(s) IV Intermittent every 12 hours  metoprolol tartrate 25 milliGRAM(s) Oral two times a day  polyethylene glycol 3350 17 Gram(s) Oral daily    MEDICATIONS  (PRN):  dextrose 40% Gel 15 Gram(s) Oral once PRN Blood Glucose LESS THAN 70 milliGRAM(s)/deciliter  glucagon  Injectable 1 milliGRAM(s) IntraMuscular once PRN Glucose LESS THAN 70 milligrams/deciliter  ondansetron Injectable 4 milliGRAM(s) IV Push every 6 hours PRN Nausea and/or Vomiting  oxyCODONE    IR 10 milliGRAM(s) Oral every 4 hours PRN Severe Pain (7 - 10)  oxyCODONE    IR 5 milliGRAM(s) Oral every 4 hours PRN Moderate Pain (4 - 6)    HEPARIN:  [x] YES [] NO  Dose:5000 UNITS Q8H  LOVENOX:[] YES [x] NO  Dose: XX mg Q24H  COUMADIN: []  YES [x] NO  Dose: XX mg  Q24H  SCD's: YES b/l  GI Prophylaxis: Protonix [], Pepcid [x], None [], (Contra-indication:.....)    Post-Op Beta-Blockers: Yes [x], No[], If No, then contraindication:  Post-Op Aspirin: Yes [x],  No [], If No, then contraindication:  Post-Op Statin: Yes [x], No[], If No, then contraindication:  Allergies    No Known Allergies    Intolerances      Ambulation/Activity Status: ambulate     Assessment/Plan:  63y Male status-post CABGx3 + left radial harvest POD#4  - Case and plan discussed with CTU Intensivist and CT Surgeon - Dr. Giron/Autumn/Chyna   - Continue CTU supportive care    - Continue DVT/GI prophylaxis  - Incentive Spirometry 10 times an hour  - Continue to advance physical activity as tolerated and continue PT/OT as directed  1. CAD: Continue ASA, statin, BB  2. HTN: lopressor  3. A. Fib prophylaxis: mag 1gm bid, lopressor  4. COPD/Hypoxia: nebs, mucinex, lasix 20mg po x 3 days      Social Service Disposition:  home

## 2019-04-26 NOTE — PROGRESS NOTE ADULT - ASSESSMENT
64 yo M h/o HTN has been c/o chest pain had + stress test as an outpt [place] here for elective cardiac cath (19 Apr 2019 09:38)  1- S/P CABG   Radial graft  pod# 4  2- essential HTN on low dose BBlockers 25 q12  3- anemai of acute blood loss  4- Thrombocytopenia  5- fluid over load pulmonary congestion   Plan  ambulate reevaluate chest tubes in afternoon  remove swan and a line     - continue  asa BBlockers and Isordil 5 mg q8  - lasix 40 iv x1   d/c home on 20mg po lasix daily x3 days  ambulate

## 2019-04-26 NOTE — PROGRESS NOTE ADULT - SUBJECTIVE AND OBJECTIVE BOX
Over Night Events:  No eventas walking on RA saturation good      ROS:  See HPI  No Known Allergies    PHYSICAL EXAM    ICU Vital Signs Last 24 Hrs  T(C): 37 (26 Apr 2019 10:21), Max: 37.1 (26 Apr 2019 00:42)  T(F): 98.6 (26 Apr 2019 10:21), Max: 98.7 (26 Apr 2019 00:42)  HR: 92 (26 Apr 2019 10:21) (84 - 94)  BP: 124/67 (26 Apr 2019 10:21) (100/59 - 141/74)  BP(mean): 76 (26 Apr 2019 10:21) (64 - 97)  ABP: --  ABP(mean): --  RR: 23 (26 Apr 2019 10:21) (16 - 23)  SpO2: 99% (26 Apr 2019 10:21) (95% - 99%)    No Known Allergies    General:AO x3  HEENT:           Nl     Lymph Nodes: NO cervical LN   Lungs: Bilateral BS  Cardiovascular: Regular   Abdomen: Soft, Positive BS  Extremities: No clubbing   Skin: Nl  Neurological: Nl    04-25-19 @ 07:01  -  04-26-19 @ 07:00  --------------------------------------------------------  IN: 710 mL / OUT: 3000 mL / NET: -2290 mL    04-26-19 @ 07:01  - 04-26-19 @ 12:11  --------------------------------------------------------  IN: 240 mL / OUT: 100 mL / NET: 140 mL        LABS:                          9.9    8.00  )-----------( 213      ( 26 Apr 2019 04:45 )             29.7                                               04-26    142  |  105  |  23<H>  ----------------------------<  106<H>  3.9   |  24  |  1.2    Ca    8.7      26 Apr 2019 04:45  Mg     2.2     04-26                                                                                                                                                                                                                           MEDICATIONS  (STANDING):  ALBUTerol/ipratropium for Nebulization 3 milliLiter(s) Nebulizer every 6 hours  aspirin enteric coated 325 milliGRAM(s) Oral daily  atorvastatin 40 milliGRAM(s) Oral at bedtime  chlorhexidine 4% Liquid 1 Application(s) Topical <User Schedule>  dextrose 5%. 1000 milliLiter(s) (50 mL/Hr) IV Continuous <Continuous>  dextrose 50% Injectable 50 milliLiter(s) IV Push every 15 minutes  dextrose 50% Injectable 25 milliLiter(s) IV Push every 15 minutes  docusate sodium 100 milliGRAM(s) Oral three times a day  famotidine    Tablet 20 milliGRAM(s) Oral two times a day  guaiFENesin  milliGRAM(s) Oral every 12 hours  heparin  Injectable 5000 Unit(s) SubCutaneous every 8 hours  magnesium sulfate  IVPB 1 Gram(s) IV Intermittent every 12 hours  metoprolol tartrate 25 milliGRAM(s) Oral two times a day  polyethylene glycol 3350 17 Gram(s) Oral daily    MEDICATIONS  (PRN):  dextrose 40% Gel 15 Gram(s) Oral once PRN Blood Glucose LESS THAN 70 milliGRAM(s)/deciliter  glucagon  Injectable 1 milliGRAM(s) IntraMuscular once PRN Glucose LESS THAN 70 milligrams/deciliter  ondansetron Injectable 4 milliGRAM(s) IV Push every 6 hours PRN Nausea and/or Vomiting  oxyCODONE    IR 10 milliGRAM(s) Oral every 4 hours PRN Severe Pain (7 - 10)  oxyCODONE    IR 5 milliGRAM(s) Oral every 4 hours PRN Moderate Pain (4 - 6)      Xrays:                                                                                     ECHO    Mild congestion

## 2019-05-02 DIAGNOSIS — Z85.51 PERSONAL HISTORY OF MALIGNANT NEOPLASM OF BLADDER: ICD-10-CM

## 2019-05-02 DIAGNOSIS — I25.118 ATHEROSCLEROTIC HEART DISEASE OF NATIVE CORONARY ARTERY WITH OTHER FORMS OF ANGINA PECTORIS: ICD-10-CM

## 2019-05-02 DIAGNOSIS — E87.70 FLUID OVERLOAD, UNSPECIFIED: ICD-10-CM

## 2019-05-02 DIAGNOSIS — E78.5 HYPERLIPIDEMIA, UNSPECIFIED: ICD-10-CM

## 2019-05-02 DIAGNOSIS — I10 ESSENTIAL (PRIMARY) HYPERTENSION: ICD-10-CM

## 2019-05-02 DIAGNOSIS — D62 ACUTE POSTHEMORRHAGIC ANEMIA: ICD-10-CM

## 2019-05-02 DIAGNOSIS — D69.6 THROMBOCYTOPENIA, UNSPECIFIED: ICD-10-CM

## 2019-05-02 DIAGNOSIS — Z96.641 PRESENCE OF RIGHT ARTIFICIAL HIP JOINT: ICD-10-CM

## 2019-05-06 ENCOUNTER — APPOINTMENT (OUTPATIENT)
Dept: CARDIOTHORACIC SURGERY | Facility: CLINIC | Age: 64
End: 2019-05-06

## 2019-05-06 ENCOUNTER — APPOINTMENT (OUTPATIENT)
Dept: CARDIOLOGY | Facility: CLINIC | Age: 64
End: 2019-05-06
Payer: COMMERCIAL

## 2019-05-06 VITALS
OXYGEN SATURATION: 98 % | HEART RATE: 90 BPM | HEIGHT: 71 IN | WEIGHT: 183 LBS | SYSTOLIC BLOOD PRESSURE: 115 MMHG | BODY MASS INDEX: 25.62 KG/M2 | TEMPERATURE: 98.6 F | RESPIRATION RATE: 14 BRPM | DIASTOLIC BLOOD PRESSURE: 72 MMHG

## 2019-05-06 PROCEDURE — 99213 OFFICE O/P EST LOW 20 MIN: CPT

## 2019-05-06 PROCEDURE — 93000 ELECTROCARDIOGRAM COMPLETE: CPT

## 2019-05-06 RX ORDER — DOCUSATE SODIUM 100 MG/1
100 CAPSULE, LIQUID FILLED ORAL
Refills: 0 | Status: DISCONTINUED | COMMUNITY
End: 2019-05-06

## 2019-05-06 RX ORDER — FUROSEMIDE 20 MG/1
20 TABLET ORAL
Refills: 0 | Status: DISCONTINUED | COMMUNITY
End: 2019-05-06

## 2019-05-06 RX ORDER — POTASSIUM CHLORIDE 750 MG/1
10 TABLET, FILM COATED, EXTENDED RELEASE ORAL
Refills: 0 | Status: DISCONTINUED | COMMUNITY
End: 2019-05-06

## 2019-05-06 RX ORDER — FAMOTIDINE 20 MG/1
20 TABLET, FILM COATED ORAL
Refills: 0 | Status: DISCONTINUED | COMMUNITY
End: 2019-05-06

## 2019-05-06 RX ORDER — ASPIRIN 325 MG/1
325 TABLET, FILM COATED ORAL
Refills: 0 | Status: ACTIVE | COMMUNITY

## 2019-05-20 ENCOUNTER — APPOINTMENT (OUTPATIENT)
Dept: CARDIOTHORACIC SURGERY | Facility: CLINIC | Age: 64
End: 2019-05-20

## 2019-05-20 VITALS
OXYGEN SATURATION: 97 % | HEIGHT: 71 IN | TEMPERATURE: 98.6 F | WEIGHT: 183 LBS | DIASTOLIC BLOOD PRESSURE: 74 MMHG | RESPIRATION RATE: 12 BRPM | SYSTOLIC BLOOD PRESSURE: 117 MMHG | HEART RATE: 91 BPM | BODY MASS INDEX: 25.62 KG/M2

## 2019-05-20 DIAGNOSIS — Z95.1 PRESENCE OF AORTOCORONARY BYPASS GRAFT: ICD-10-CM

## 2019-05-20 RX ORDER — METOPROLOL TARTRATE 25 MG/1
25 TABLET, FILM COATED ORAL
Qty: 60 | Refills: 0 | Status: ACTIVE | COMMUNITY
Start: 1900-01-01 | End: 1900-01-01

## 2019-06-03 ENCOUNTER — APPOINTMENT (OUTPATIENT)
Dept: CARDIOLOGY | Facility: CLINIC | Age: 64
End: 2019-06-03
Payer: COMMERCIAL

## 2019-06-03 PROCEDURE — 93306 TTE W/DOPPLER COMPLETE: CPT

## 2019-06-03 PROCEDURE — 93000 ELECTROCARDIOGRAM COMPLETE: CPT | Mod: 59

## 2019-06-03 PROCEDURE — 99213 OFFICE O/P EST LOW 20 MIN: CPT | Mod: 25

## 2020-04-13 NOTE — ASU PATIENT PROFILE, ADULT - NS PRO ABUSE SCREEN SUSPICION NEGLECT YN
Prep Survey      Responses   [facility]  [facility]   Is LACE score < 7 ?  No   Eligibility  [facility]   Discharge diagnosis   Chest Pain CHF with acute exacerbation   COVID-19 Test Status  Not tested   Does the patient have one of the following disease processes/diagnoses(primary or secondary)?  CHF   Does the patient have Home health ordered?  Yes   What is the Home health agency?   [facility]    Is there a DME ordered?  No   Prep survey completed?  Yes          Julee Stone RN        
no

## 2020-08-13 RX ORDER — ISOSORBIDE MONONITRATE 30 MG/1
30 TABLET, EXTENDED RELEASE ORAL DAILY
Qty: 30 | Refills: 0 | Status: DISCONTINUED | COMMUNITY
Start: 1900-01-01 | End: 2020-08-13

## 2020-08-13 RX ORDER — OXYCODONE HYDROCHLORIDE AND ACETAMINOPHEN 5; 325 MG/1; MG/1
5-325 TABLET ORAL
Refills: 0 | Status: DISCONTINUED | COMMUNITY
End: 2020-08-13

## 2020-11-19 RX ORDER — ATORVASTATIN CALCIUM 40 MG/1
40 TABLET, FILM COATED ORAL
Qty: 30 | Refills: 1 | Status: DISCONTINUED | COMMUNITY
End: 2020-11-19

## 2021-02-16 RX ORDER — ATORVASTATIN CALCIUM 80 MG/1
80 TABLET, FILM COATED ORAL
Qty: 90 | Refills: 3 | Status: ACTIVE | COMMUNITY
Start: 2020-08-13 | End: 1900-01-01

## 2021-09-07 RX ORDER — EZETIMIBE 10 MG/1
10 TABLET ORAL
Qty: 90 | Refills: 3 | Status: ACTIVE | COMMUNITY
Start: 2020-09-15 | End: 1900-01-01

## 2022-03-02 NOTE — PATIENT PROFILE ADULT - FALL HARM RISK TYPE OF ASSESSMENT
[id number]       [facility] [place] [place] - [address] AT Portal to [facility]            Last seen: 04/23/21  Follow up appointment: none  Last filled: 4/27/21    Okay to refill?   Please advise.      admission

## 2025-04-25 NOTE — H&P CARDIOLOGY - NS NEC GEN PE MLT EXAM PC
No bruits; no thyromegaly or nodules
How Severe Is Your Psoriasis?: moderate
Is This A New Presentation, Or A Follow-Up?: Follow Up Psoriasis